# Patient Record
Sex: FEMALE | Race: WHITE | NOT HISPANIC OR LATINO
[De-identification: names, ages, dates, MRNs, and addresses within clinical notes are randomized per-mention and may not be internally consistent; named-entity substitution may affect disease eponyms.]

---

## 2017-10-23 ENCOUNTER — APPOINTMENT (OUTPATIENT)
Dept: INTERNAL MEDICINE | Facility: CLINIC | Age: 47
End: 2017-10-23

## 2018-05-25 ENCOUNTER — EMERGENCY (EMERGENCY)
Facility: HOSPITAL | Age: 48
LOS: 1 days | Discharge: ANOTHER HOSPITAL | End: 2018-05-25
Attending: EMERGENCY MEDICINE | Admitting: EMERGENCY MEDICINE
Payer: SELF-PAY

## 2018-05-25 VITALS
SYSTOLIC BLOOD PRESSURE: 137 MMHG | RESPIRATION RATE: 20 BRPM | OXYGEN SATURATION: 100 % | DIASTOLIC BLOOD PRESSURE: 90 MMHG | TEMPERATURE: 98 F | HEART RATE: 67 BPM

## 2018-05-25 DIAGNOSIS — Z98.84 BARIATRIC SURGERY STATUS: Chronic | ICD-10-CM

## 2018-05-25 LAB
ALBUMIN SERPL ELPH-MCNC: 4.3 G/DL — SIGNIFICANT CHANGE UP (ref 3.3–5)
ALP SERPL-CCNC: 94 U/L — SIGNIFICANT CHANGE UP (ref 40–120)
ALT FLD-CCNC: 24 U/L — SIGNIFICANT CHANGE UP (ref 4–33)
AST SERPL-CCNC: 28 U/L — SIGNIFICANT CHANGE UP (ref 4–32)
BASE EXCESS BLDV CALC-SCNC: 0.5 MMOL/L — SIGNIFICANT CHANGE UP
BASOPHILS # BLD AUTO: 0.06 K/UL — SIGNIFICANT CHANGE UP (ref 0–0.2)
BASOPHILS NFR BLD AUTO: 1 % — SIGNIFICANT CHANGE UP (ref 0–2)
BILIRUB SERPL-MCNC: 0.6 MG/DL — SIGNIFICANT CHANGE UP (ref 0.2–1.2)
BLOOD GAS VENOUS - CREATININE: 0.69 MG/DL — SIGNIFICANT CHANGE UP (ref 0.5–1.3)
BUN SERPL-MCNC: 11 MG/DL — SIGNIFICANT CHANGE UP (ref 7–23)
CALCIUM SERPL-MCNC: 9.1 MG/DL — SIGNIFICANT CHANGE UP (ref 8.4–10.5)
CHLORIDE BLDV-SCNC: 105 MMOL/L — SIGNIFICANT CHANGE UP (ref 96–108)
CHLORIDE SERPL-SCNC: 101 MMOL/L — SIGNIFICANT CHANGE UP (ref 98–107)
CO2 SERPL-SCNC: 24 MMOL/L — SIGNIFICANT CHANGE UP (ref 22–31)
CREAT SERPL-MCNC: 0.67 MG/DL — SIGNIFICANT CHANGE UP (ref 0.5–1.3)
EOSINOPHIL # BLD AUTO: 0.02 K/UL — SIGNIFICANT CHANGE UP (ref 0–0.5)
EOSINOPHIL NFR BLD AUTO: 0.3 % — SIGNIFICANT CHANGE UP (ref 0–6)
GAS PNL BLDV: 137 MMOL/L — SIGNIFICANT CHANGE UP (ref 136–146)
GLUCOSE BLDV-MCNC: 127 — HIGH (ref 70–99)
GLUCOSE SERPL-MCNC: 121 MG/DL — HIGH (ref 70–99)
HCG SERPL-ACNC: < 5 MIU/ML — SIGNIFICANT CHANGE UP
HCO3 BLDV-SCNC: 23 MMOL/L — SIGNIFICANT CHANGE UP (ref 20–27)
HCT VFR BLD CALC: 40 % — SIGNIFICANT CHANGE UP (ref 34.5–45)
HCT VFR BLDV CALC: 44.4 % — SIGNIFICANT CHANGE UP (ref 34.5–45)
HGB BLD-MCNC: 13.6 G/DL — SIGNIFICANT CHANGE UP (ref 11.5–15.5)
HGB BLDV-MCNC: 14.5 G/DL — SIGNIFICANT CHANGE UP (ref 11.5–15.5)
IMM GRANULOCYTES # BLD AUTO: 0.02 # — SIGNIFICANT CHANGE UP
IMM GRANULOCYTES NFR BLD AUTO: 0.3 % — SIGNIFICANT CHANGE UP (ref 0–1.5)
LACTATE BLDV-MCNC: 2.1 MMOL/L — HIGH (ref 0.5–2)
LIDOCAIN IGE QN: 13.8 U/L — SIGNIFICANT CHANGE UP (ref 7–60)
LYMPHOCYTES # BLD AUTO: 0.71 K/UL — LOW (ref 1–3.3)
LYMPHOCYTES # BLD AUTO: 11.8 % — LOW (ref 13–44)
MCHC RBC-ENTMCNC: 28.5 PG — SIGNIFICANT CHANGE UP (ref 27–34)
MCHC RBC-ENTMCNC: 34 % — SIGNIFICANT CHANGE UP (ref 32–36)
MCV RBC AUTO: 83.7 FL — SIGNIFICANT CHANGE UP (ref 80–100)
MONOCYTES # BLD AUTO: 0.28 K/UL — SIGNIFICANT CHANGE UP (ref 0–0.9)
MONOCYTES NFR BLD AUTO: 4.6 % — SIGNIFICANT CHANGE UP (ref 2–14)
NEUTROPHILS # BLD AUTO: 4.95 K/UL — SIGNIFICANT CHANGE UP (ref 1.8–7.4)
NEUTROPHILS NFR BLD AUTO: 82 % — HIGH (ref 43–77)
NRBC # FLD: 0 — SIGNIFICANT CHANGE UP
PCO2 BLDV: 43 MMHG — SIGNIFICANT CHANGE UP (ref 41–51)
PH BLDV: 7.38 PH — SIGNIFICANT CHANGE UP (ref 7.32–7.43)
PLATELET # BLD AUTO: 323 K/UL — SIGNIFICANT CHANGE UP (ref 150–400)
PMV BLD: 10.4 FL — SIGNIFICANT CHANGE UP (ref 7–13)
PO2 BLDV: 30 MMHG — LOW (ref 35–40)
POTASSIUM BLDV-SCNC: 3.8 MMOL/L — SIGNIFICANT CHANGE UP (ref 3.4–4.5)
POTASSIUM SERPL-MCNC: 4.2 MMOL/L — SIGNIFICANT CHANGE UP (ref 3.5–5.3)
POTASSIUM SERPL-SCNC: 4.2 MMOL/L — SIGNIFICANT CHANGE UP (ref 3.5–5.3)
PROT SERPL-MCNC: 7.2 G/DL — SIGNIFICANT CHANGE UP (ref 6–8.3)
RBC # BLD: 4.78 M/UL — SIGNIFICANT CHANGE UP (ref 3.8–5.2)
RBC # FLD: 13.7 % — SIGNIFICANT CHANGE UP (ref 10.3–14.5)
SAO2 % BLDV: 48.2 % — LOW (ref 60–85)
SODIUM SERPL-SCNC: 139 MMOL/L — SIGNIFICANT CHANGE UP (ref 135–145)
WBC # BLD: 6.04 K/UL — SIGNIFICANT CHANGE UP (ref 3.8–10.5)
WBC # FLD AUTO: 6.04 K/UL — SIGNIFICANT CHANGE UP (ref 3.8–10.5)

## 2018-05-25 PROCEDURE — 99285 EMERGENCY DEPT VISIT HI MDM: CPT

## 2018-05-25 RX ORDER — FAMOTIDINE 10 MG/ML
20 INJECTION INTRAVENOUS ONCE
Qty: 0 | Refills: 0 | Status: COMPLETED | OUTPATIENT
Start: 2018-05-25 | End: 2018-05-25

## 2018-05-25 RX ORDER — METOCLOPRAMIDE HCL 10 MG
10 TABLET ORAL ONCE
Qty: 0 | Refills: 0 | Status: COMPLETED | OUTPATIENT
Start: 2018-05-25 | End: 2018-05-25

## 2018-05-25 RX ORDER — MORPHINE SULFATE 50 MG/1
4 CAPSULE, EXTENDED RELEASE ORAL ONCE
Qty: 0 | Refills: 0 | Status: DISCONTINUED | OUTPATIENT
Start: 2018-05-25 | End: 2018-05-25

## 2018-05-25 RX ORDER — SODIUM CHLORIDE 9 MG/ML
2000 INJECTION INTRAMUSCULAR; INTRAVENOUS; SUBCUTANEOUS ONCE
Qty: 0 | Refills: 0 | Status: COMPLETED | OUTPATIENT
Start: 2018-05-25 | End: 2018-05-25

## 2018-05-25 RX ORDER — ONDANSETRON 8 MG/1
4 TABLET, FILM COATED ORAL ONCE
Qty: 0 | Refills: 0 | Status: COMPLETED | OUTPATIENT
Start: 2018-05-25 | End: 2018-05-25

## 2018-05-25 RX ADMIN — FAMOTIDINE 104 MILLIGRAM(S): 10 INJECTION INTRAVENOUS at 22:10

## 2018-05-25 RX ADMIN — ONDANSETRON 4 MILLIGRAM(S): 8 TABLET, FILM COATED ORAL at 21:44

## 2018-05-25 RX ADMIN — Medication 10 MILLIGRAM(S): at 22:51

## 2018-05-25 RX ADMIN — SODIUM CHLORIDE 2000 MILLILITER(S): 9 INJECTION INTRAMUSCULAR; INTRAVENOUS; SUBCUTANEOUS at 22:45

## 2018-05-25 NOTE — ED PROVIDER NOTE - PHYSICAL EXAMINATION
Dr Jaramillo  pt sitting up, anxious, yelling. Aox3, mmm. non icteric. normal S1-S2 good air entry. nondistended, +BS, nontender to palpation. no guarding.

## 2018-05-25 NOTE — ED PROVIDER NOTE - OBJECTIVE STATEMENT
Dr Jaramillo  46yo F hx of asthma, sp gastric sleeve in sept 2017 at [facility], pw abdominal pain, nausea and vomit. States he had abdominal pain and vomited two days ago, went to an  given nausea med, felt better. Yesterday she was "fine". Today had "fish tacos" abdominal pain began, felt nausea and vomited several times. NBNB vomit. no diarrhea. +"a lot of gas from above" went to store for "gas medication" called EMS. no fever. no chest pain. no sob. Denies preg.   surgical hx "gastric cysts as a child" "skin graft" of abdomen years ago. 58 y/o F s/p gastric sleeve 09/2017 presents with complaint of upper abdominal pain and nausea/vomiting. Patient began to have symptoms 2 days ago which initially resolved but now recurred. Denies any fever, chills,   Dr Jaramillo  46yo F hx of asthma, sp gastric sleeve in sept 2017 at [facility], pw abdominal pain, nausea and vomit. States he had abdominal pain and vomited two days ago, went to an  given nausea med, felt better. Yesterday she was "fine". Today had "fish tacos" abdominal pain began, felt nausea and vomited several times. NBNB vomit. no diarrhea. +"a lot of gas from above" went to store for "gas medication" called EMS. no fever. no chest pain. no sob. Denies preg.   surgical hx "gastric cysts as a child" "skin graft" of abdomen years ago. 56 y/o F s/p gastric sleeve 09/2017 presents with complaint of upper abdominal pain and nausea/vomiting. Patient began to have symptoms 2 days ago which resolved yesterday. Symptoms recurred today after eating "Fish tacos". Complaining of upper abd discomfort with nausea/vomiting today. Unable to tolerate PO. States that she has been taking zantac with relieve. Denies any fever, chills.  Dr Jaramillo  46yo F hx of asthma, sp gastric sleeve in sept 2017 at [facility], pw abdominal pain, nausea and vomit. States he had abdominal pain and vomited two days ago, went to an  given nausea med, felt better. Yesterday she was "fine". Today had "fish tacos" abdominal pain began, felt nausea and vomited several times. NBNB vomit. no diarrhea. +"a lot of gas from above" went to store for "gas medication" called EMS. no fever. no chest pain. no sob. Denies preg.   surgical hx "gastric cysts as a child" "skin graft" of abdomen years ago.

## 2018-05-25 NOTE — ED ADULT TRIAGE NOTE - CHIEF COMPLAINT QUOTE
brought from [facility] by EMS c/o abdm pain associated w/ upper abdm pain, non-radiating, s/p bariatric sleeve placement 9/2017, states attempted PO intake 2 days ago followed by N/V and abdm pain, resolved, attempted PO intake today followed by same, appears uncomfortable on presentation

## 2018-05-25 NOTE — ED ADULT NURSE NOTE - OBJECTIVE STATEMENT
47y F AaOx3 c/o upper abdominal pain x1 day. pt states that her pain has started earlier today after eating a shrimp taco . pt states her pain is a 10/10 and denies diarrhea at this time. pt presents with nausea and vomiting. pt states it is a cramping pain and a squeezing in her insides. pt deneis blood in stool, pain during urination, or any GI medical hx. pt has not taken anything for the medication before entering ED. VS as noted. labs collected and sent. pt 47y F AaOx3 c/o upper abdominal pain x1 day. pt states that her pain has started earlier today after eating a shrimp taco . pt states her pain is a 10/10 and denies diarrhea at this time. pt presents with nausea and vomiting. pt states it is a cramping pain and a squeezing in her insides. pt deneis blood in stool, pain during urination, or any GI medical hx. pt has not taken anything for the medication before entering ED. VS as noted. labs collected and sent. pt medicated as per md order

## 2018-05-25 NOTE — ED PROVIDER NOTE - PROGRESS NOTE DETAILS
pt calm no vomiting. pending ct to be done. Endorsed to Dr Hutchinson AJM: pt received in sign out. workup shows possible acalc sally. since pt is bariatic patient she needs eval by bariatric team at [facility]. Accepted by surgery dr Walter Cutler and ED doctor Dr White. pt consented.

## 2018-05-26 ENCOUNTER — EMERGENCY (EMERGENCY)
Facility: HOSPITAL | Age: 48
LOS: 1 days | Discharge: HOME | End: 2018-05-26
Attending: EMERGENCY MEDICINE
Payer: MEDICAID

## 2018-05-26 VITALS
OXYGEN SATURATION: 98 % | RESPIRATION RATE: 16 BRPM | SYSTOLIC BLOOD PRESSURE: 112 MMHG | TEMPERATURE: 98 F | HEART RATE: 53 BPM | DIASTOLIC BLOOD PRESSURE: 60 MMHG

## 2018-05-26 VITALS
SYSTOLIC BLOOD PRESSURE: 116 MMHG | DIASTOLIC BLOOD PRESSURE: 73 MMHG | HEART RATE: 65 BPM | RESPIRATION RATE: 16 BRPM | OXYGEN SATURATION: 100 %

## 2018-05-26 VITALS
SYSTOLIC BLOOD PRESSURE: 168 MMHG | OXYGEN SATURATION: 100 % | DIASTOLIC BLOOD PRESSURE: 93 MMHG | HEART RATE: 54 BPM | RESPIRATION RATE: 16 BRPM

## 2018-05-26 DIAGNOSIS — Z98.84 BARIATRIC SURGERY STATUS: Chronic | ICD-10-CM

## 2018-05-26 LAB
ALBUMIN SERPL ELPH-MCNC: 3.7 G/DL — SIGNIFICANT CHANGE UP (ref 3.3–5)
ALP SERPL-CCNC: 80 U/L — SIGNIFICANT CHANGE UP (ref 40–120)
ALT FLD-CCNC: 31 U/L — SIGNIFICANT CHANGE UP (ref 10–45)
ANION GAP SERPL CALC-SCNC: 11 MMOL/L — SIGNIFICANT CHANGE UP (ref 5–17)
APTT BLD: 24.2 SEC — LOW (ref 27.5–37.4)
AST SERPL-CCNC: 30 U/L — SIGNIFICANT CHANGE UP (ref 10–40)
BASOPHILS # BLD AUTO: 0.1 K/UL — SIGNIFICANT CHANGE UP (ref 0–0.2)
BASOPHILS NFR BLD AUTO: 1.2 % — SIGNIFICANT CHANGE UP (ref 0–2)
BILIRUB SERPL-MCNC: 0.8 MG/DL — SIGNIFICANT CHANGE UP (ref 0.2–1.2)
BLD GP AB SCN SERPL QL: NEGATIVE — SIGNIFICANT CHANGE UP
BUN SERPL-MCNC: 7 MG/DL — SIGNIFICANT CHANGE UP (ref 7–23)
CALCIUM SERPL-MCNC: 9 MG/DL — SIGNIFICANT CHANGE UP (ref 8.4–10.5)
CHLORIDE SERPL-SCNC: 103 MMOL/L — SIGNIFICANT CHANGE UP (ref 96–108)
CO2 SERPL-SCNC: 23 MMOL/L — SIGNIFICANT CHANGE UP (ref 22–31)
CREAT SERPL-MCNC: 0.64 MG/DL — SIGNIFICANT CHANGE UP (ref 0.5–1.3)
EOSINOPHIL # BLD AUTO: 0.1 K/UL — SIGNIFICANT CHANGE UP (ref 0–0.5)
EOSINOPHIL NFR BLD AUTO: 0.9 % — SIGNIFICANT CHANGE UP (ref 0–6)
GLUCOSE SERPL-MCNC: 108 MG/DL — HIGH (ref 70–99)
HCT VFR BLD CALC: 38.6 % — SIGNIFICANT CHANGE UP (ref 34.5–45)
HGB BLD-MCNC: 12.7 G/DL — SIGNIFICANT CHANGE UP (ref 11.5–15.5)
INR BLD: 1.12 RATIO — SIGNIFICANT CHANGE UP (ref 0.88–1.16)
LYMPHOCYTES # BLD AUTO: 1.4 K/UL — SIGNIFICANT CHANGE UP (ref 1–3.3)
LYMPHOCYTES # BLD AUTO: 23.6 % — SIGNIFICANT CHANGE UP (ref 13–44)
MCHC RBC-ENTMCNC: 28.8 PG — SIGNIFICANT CHANGE UP (ref 27–34)
MCHC RBC-ENTMCNC: 33 GM/DL — SIGNIFICANT CHANGE UP (ref 32–36)
MCV RBC AUTO: 87.5 FL — SIGNIFICANT CHANGE UP (ref 80–100)
MONOCYTES # BLD AUTO: 0.5 K/UL — SIGNIFICANT CHANGE UP (ref 0–0.9)
MONOCYTES NFR BLD AUTO: 8.1 % — SIGNIFICANT CHANGE UP (ref 2–14)
NEUTROPHILS # BLD AUTO: 3.8 K/UL — SIGNIFICANT CHANGE UP (ref 1.8–7.4)
NEUTROPHILS NFR BLD AUTO: 66.2 % — SIGNIFICANT CHANGE UP (ref 43–77)
PLATELET # BLD AUTO: 273 K/UL — SIGNIFICANT CHANGE UP (ref 150–400)
POTASSIUM SERPL-MCNC: 3.7 MMOL/L — SIGNIFICANT CHANGE UP (ref 3.5–5.3)
POTASSIUM SERPL-SCNC: 3.7 MMOL/L — SIGNIFICANT CHANGE UP (ref 3.5–5.3)
PROT SERPL-MCNC: 6 G/DL — SIGNIFICANT CHANGE UP (ref 6–8.3)
PROTHROM AB SERPL-ACNC: 12.1 SEC — SIGNIFICANT CHANGE UP (ref 9.8–12.7)
RBC # BLD: 4.41 M/UL — SIGNIFICANT CHANGE UP (ref 3.8–5.2)
RBC # FLD: 12.5 % — SIGNIFICANT CHANGE UP (ref 10.3–14.5)
RH IG SCN BLD-IMP: POSITIVE — SIGNIFICANT CHANGE UP
RH IG SCN BLD-IMP: POSITIVE — SIGNIFICANT CHANGE UP
SODIUM SERPL-SCNC: 137 MMOL/L — SIGNIFICANT CHANGE UP (ref 135–145)
WBC # BLD: 5.8 K/UL — SIGNIFICANT CHANGE UP (ref 3.8–10.5)
WBC # FLD AUTO: 5.8 K/UL — SIGNIFICANT CHANGE UP (ref 3.8–10.5)

## 2018-05-26 PROCEDURE — 86900 BLOOD TYPING SEROLOGIC ABO: CPT

## 2018-05-26 PROCEDURE — 99284 EMERGENCY DEPT VISIT MOD MDM: CPT | Mod: 25

## 2018-05-26 PROCEDURE — 80053 COMPREHEN METABOLIC PANEL: CPT

## 2018-05-26 PROCEDURE — 86850 RBC ANTIBODY SCREEN: CPT

## 2018-05-26 PROCEDURE — 78226 HEPATOBILIARY SYSTEM IMAGING: CPT | Mod: 26

## 2018-05-26 PROCEDURE — 78226 HEPATOBILIARY SYSTEM IMAGING: CPT

## 2018-05-26 PROCEDURE — 85610 PROTHROMBIN TIME: CPT

## 2018-05-26 PROCEDURE — 86901 BLOOD TYPING SEROLOGIC RH(D): CPT

## 2018-05-26 PROCEDURE — 85730 THROMBOPLASTIN TIME PARTIAL: CPT

## 2018-05-26 PROCEDURE — 76705 ECHO EXAM OF ABDOMEN: CPT | Mod: 26

## 2018-05-26 PROCEDURE — A9537: CPT

## 2018-05-26 PROCEDURE — 74176 CT ABD & PELVIS W/O CONTRAST: CPT | Mod: 26

## 2018-05-26 PROCEDURE — 85027 COMPLETE CBC AUTOMATED: CPT

## 2018-05-26 RX ORDER — HALOPERIDOL DECANOATE 100 MG/ML
2.5 INJECTION INTRAMUSCULAR ONCE
Qty: 0 | Refills: 0 | Status: COMPLETED | OUTPATIENT
Start: 2018-05-26 | End: 2018-05-26

## 2018-05-26 RX ADMIN — HALOPERIDOL DECANOATE 2.5 MILLIGRAM(S): 100 INJECTION INTRAMUSCULAR at 01:33

## 2018-05-26 NOTE — ED ADULT NURSE REASSESSMENT NOTE - NS ED NURSE REASSESS COMMENT FT1
vss; iv site benign; + flush; + blood return;  pt very groggy; wakes to voice and follows commands; pt was given haldol at previous hospital

## 2018-05-26 NOTE — CONSULT NOTE ADULT - ASSESSMENT
47 year old female s/p sleeve gastrectomy in September 2017 presents with 2 days of right upper quadrant pain, nausea and vomiting.  Ultrasound performed shows evidence of gallbladder wall thickening to 5mm and mild pericholecystic fluid, no evidence of stones concerning for acalculous cholecystitis  -Patient's abdominal pain resolved  -Labs reviewed from [facility], normal WBC and LFTs  -Please obtain a HIDA scan to rule out acute cholecystitis 47 year old female s/p sleeve gastrectomy in September 2017 presents with 2 days of right upper quadrant pain, nausea and vomiting.  Ultrasound performed shows evidence of gallbladder wall thickening to 5mm and mild pericholecystic fluid, no evidence of stones concerning for acalculous cholecystitis  -Patient's abdominal pain resolved  -Labs reviewed from [facility], normal WBC and LFTs  -Please obtain a HIDA scan to rule out acute cholecystitis    *Addendum   HIDA scan reviewed, negative for acute cholecystitis  Trial of PO, if patient tolerated, can be discharged with follow up with their bariatric surgeon

## 2018-05-26 NOTE — ED PROVIDER NOTE - PHYSICAL EXAMINATION
Gen: Well appearing, sleepy, NAD  Head: NCAT  HEENT: MMM, Normal conjunctiva  Lung: CTAB, no rales, rhonchi or wheezing  CV: RRR, no murmurs, rubs or gallops  Abd: soft, NTND, no rebound or guarding  MSK: No edema, no visible deformities  Neuro: No focal neurologic deficits.  Skin: Warm and dry, no evidence of rash  Psych: normal mood and affect, A&Ox3

## 2018-05-26 NOTE — ED PROVIDER NOTE - ATTENDING CONTRIBUTION TO CARE
ATTENDING MD:  Adriano VALLEJO, personally have seen and examined this patient.  I have discussed all aspects of care with the resident physician. Resident note reviewed and agree on plan of care and except where noted.  See HPI, PE, and MDM for details.     GEN: laying in stretcher comfortably, NAD, A & O x 4  HEAD/EYES: NCAT, PERRL, EOMI, anicteric sclerae, no conjunctival pallor  ENT: mucus membranes moist, oropharynx WNL, trachea midline, no JVD  RESP: lungs CTA with equal breath sounds bilaterally, chest wall nontender and atraumatic  CV: heart with reg rhythm S1, S2, no murmur; distal pulses intact and symmetric bilaterally  ABDOMEN: normoactive bowel sounds, soft, nondistended, nontender, no palpable masses  : no CVAT  MSK: extremities atraumatic and nontender, no edema, no asymmetry. the back is nontender  SKIN: warm, dry, no rash, no bruising, no cyanosis. color appropriate for ethnicity  NEURO: alert, mentating appropriately, no facial asymmetry. gross sensation, motor, coordination are intact    MDM: comfortable female with upper abdominal pain and vomiting now resolved, with imaging concerning for acalculous cholecystitis. surgery consult, ZARI PRN. no need for acute pain management.

## 2018-05-26 NOTE — ED ADULT NURSE NOTE - OBJECTIVE STATEMENT
Transfer from [facility] for bariatric consult. As per EMS, pt with hx gastric sleeve surgery a year ago. Pt had 3 days abdominal pain and went to [facility]. CT and ultrasound scan done at [facility] suspicious for cholecystitis. On arrival to ED, pt very sleepy. Pt received Haldol at [facility] as per EMS. Pt arouses to voice. Follows commands. Denies cp/sob. Respirations even and unlabored. No n/v/d at present. Awaiting eval by MD.

## 2018-05-26 NOTE — CONSULT NOTE ADULT - ATTENDING COMMENTS
I have seen and examined the patient. I agree with the above surgery resident's note.  HIDA to r/o cholecystitis

## 2018-05-26 NOTE — ED PROVIDER NOTE - OBJECTIVE STATEMENT
47 y.o. female hx of gastric sleeve in 2017, transfer from [facility] after presenting with upper ab pain x 2 days, worsening today with nausea and vomiting. Had ct and US concerning for acalculous cholecystitis, given analgesia and haldol for "agitation" and transferred for bariatric consultation. Pt currently sleepy but without pain or nausea at this time.

## 2018-05-26 NOTE — CONSULT NOTE ADULT - SUBJECTIVE AND OBJECTIVE BOX
47 y.o. female hx of gastric sleeve in September of 2017 at [place], transfer from [facility] after presenting with upper ab pain x 2 days, worsening last night with nausea and vomiting. Had ct and US concerning for acalculous cholecystitis, given analgesia and haldol for "agitation" and transferred for bariatric consultation. Pt currently sleepy but without pain or nausea at this time.  Patient reports 2 days ago after eating chicken and rice, started having some epigastric pain and several episodes of non bloody non billious emesis.  Pain resolved and then last night had shrimp tacos and started having similar pain.  No nausea or vomiting.  No report of any fevers, chills, shortness of breath, palpitations.      PAST MEDICAL & SURGICAL HISTORY:    ICU Vital Signs Last 24 Hrs  T(C): 36.7 (26 May 2018 07:45), Max: 36.9 (26 May 2018 06:55)  T(F): 98.1 (26 May 2018 07:45), Max: 98.4 (26 May 2018 06:55)  HR: 50 (26 May 2018 07:45) (50 - 53)  BP: 108/62 (26 May 2018 07:45) (108/62 - 112/60)  BP(mean): --  ABP: --  ABP(mean): --  RR: 14 (26 May 2018 07:45) (14 - 16)  SpO2: 97% (26 May 2018 07:45) (97% - 98%)    General:  Sitting up in bed, appears comfortable  Chest:  Breath sounds audible bilaterally; no wheezing, rales or ronchi  Abdomen:  Soft, nontender, nondistended  Extremities:  Warm, well perfused                          12.7   5.8   )-----------( 273      ( 26 May 2018 07:56 )             38.6     EXAM:  CT ABDOMEN AND PELVIS        PROCEDURE DATE:  May 26 2018         INTERPRETATION:  CLINICAL INFORMATION: Epigastric pain for 2 days. Nausea   and vomiting. History of gastric sleeve.    COMPARISON: None available.    PROCEDURE:   CT of the Abdomen and Pelvis was performed without intravenous contrast.   Intravenous contrast: None.  Oral contrast: None.  Sagittal and coronal reformats were performed.    FINDINGS:    LOWER CHEST: Within normal limits.    LIVER: Within normal limits.  BILE DUCTS: Normal caliber.  GALLBLADDER: Asymmetric gallbladder wall edema. No radiopaque   cholelithiasis.  SPLEEN: Within normal limits.  PANCREAS: Fatty infiltration of the head of the pancreas.  ADRENALS: Within normal limits.  KIDNEYS/URETERS: Within normal limits.    BLADDER: Within normal limits.  REPRODUCTIVE ORGANS: The uterus and right ovary are within normal limits.   3.4 x 2.0 cm left ovarian cyst.    BOWEL: Status post gastric sleeve procedure. No bowel obstruction or   inflammation. Appendix is not visualized.  PERITONEUM: Trace ascites in the right paracolic gutter and pelvis.  VESSELS:  Mild atherosclerotic changes.  RETROPERITONEUM: No lymphadenopathy.    ABDOMINAL WALL: Within normal limits.  BONES: Within normal limits.    IMPRESSION:     Asymmetric gallbladder wall edema which raises concern for an acute   cholecystitis despite the lack of calcified gallstones. Correlate with   laboratory values, physical examination and if clinically warranted a   right upper quadrant ultrasound or nuclear HIDA scan.      EXAM:  US ABDOMEN LIMITED        PROCEDURE DATE:  May 26 2018         INTERPRETATION:  CLINICAL INFORMATION: Right upper quadrant pain.   Gallbladder wall edema on CT.    COMPARISON: CT abdomen and pelvis from 5/26/2018 at 1:56 AM.    TECHNIQUE: Sonography of the right upper quadrant.     FINDINGS:    Liver: Right hepatic lobe measures 14.2 cm. Within normal limits.    Bile ducts: Normal caliber. Common hepatic duct measures 3 mm.     Gallbladder: No discrete gallstone is visualized. Gallbladder wall is   thickened to 5 mm. Mild pericholecystic fluid is present. Equivocal   sonographic Hernandez's sign as the patient received pain medication.    Pancreas: Not well visualized.    Right kidney: 11.9 cm. No hydronephrosis.    Ascites: None.    Aorta/IVC: Visualized portions are within normal limits.    IMPRESSION:     Gallbladder wall thickening and mild pericholecystic fluid without   evidence of a gallstone raising the possibility of an acalculus   cholecystitis.

## 2018-05-26 NOTE — ED PROVIDER NOTE - NS ED ROS FT
ROS: denies HA, weakness, dizziness, fevers/chills, chest pain, SOB, diaphoresis, back/neck pain, dysuria/hematuria, or rash  +ab pain, nausea, vomiting CONST: no fevers, no chills, no trauma  EYES: no pain, no visual disturbances  ENT: no sore throat, no epistaxis, no rhinorrhea, no hearing changes  CV: no chest pain, no palpitations, no orthopnea, no extremity pain or swelling  RESP: no shortness of breath, no cough, no sputum, no pleurisy, no wheezing  ABD: see HPI  : no dysuria, no hematuria, no frequency, no urgency  MSK: no back pain, no neck pain, no extremity pain  NEURO: no headache, no sensory disturbances, no focal weakness, no dizziness  HEME: no easy bleeding or bruising  SKIN: no diaphoresis, no rash

## 2018-05-26 NOTE — ED PROVIDER NOTE - PROGRESS NOTE DETAILS
Faustinoa negative, pt tolerated PO, feeling well, wants to go home, cleared by surgery, ok for discharge. Dylan Watkins DO

## 2019-06-07 ENCOUNTER — OUTPATIENT (OUTPATIENT)
Dept: OUTPATIENT SERVICES | Facility: HOSPITAL | Age: 49
LOS: 1 days | End: 2019-06-07
Payer: SELF-PAY

## 2019-06-07 VITALS
OXYGEN SATURATION: 100 % | HEIGHT: 66 IN | HEART RATE: 65 BPM | RESPIRATION RATE: 16 BRPM | TEMPERATURE: 99 F | WEIGHT: 151.02 LBS | DIASTOLIC BLOOD PRESSURE: 75 MMHG | SYSTOLIC BLOOD PRESSURE: 140 MMHG

## 2019-06-07 DIAGNOSIS — H02.403 UNSPECIFIED PTOSIS OF BILATERAL EYELIDS: ICD-10-CM

## 2019-06-07 DIAGNOSIS — Z01.818 ENCOUNTER FOR OTHER PREPROCEDURAL EXAMINATION: ICD-10-CM

## 2019-06-07 DIAGNOSIS — Z98.84 BARIATRIC SURGERY STATUS: Chronic | ICD-10-CM

## 2019-06-07 DIAGNOSIS — H02.409 UNSPECIFIED PTOSIS OF UNSPECIFIED EYELID: ICD-10-CM

## 2019-06-07 LAB
ALBUMIN SERPL ELPH-MCNC: 4.3 G/DL — SIGNIFICANT CHANGE UP (ref 3.3–5)
ALP SERPL-CCNC: 130 U/L — HIGH (ref 40–120)
ALT FLD-CCNC: 29 U/L — SIGNIFICANT CHANGE UP (ref 12–78)
ANION GAP SERPL CALC-SCNC: 4 MMOL/L — LOW (ref 5–17)
AST SERPL-CCNC: 19 U/L — SIGNIFICANT CHANGE UP (ref 15–37)
BILIRUB SERPL-MCNC: 0.7 MG/DL — SIGNIFICANT CHANGE UP (ref 0.2–1.2)
BUN SERPL-MCNC: 12 MG/DL — SIGNIFICANT CHANGE UP (ref 7–23)
CALCIUM SERPL-MCNC: 9.2 MG/DL — SIGNIFICANT CHANGE UP (ref 8.5–10.1)
CHLORIDE SERPL-SCNC: 107 MMOL/L — SIGNIFICANT CHANGE UP (ref 96–108)
CO2 SERPL-SCNC: 28 MMOL/L — SIGNIFICANT CHANGE UP (ref 22–31)
CREAT SERPL-MCNC: 0.79 MG/DL — SIGNIFICANT CHANGE UP (ref 0.5–1.3)
GLUCOSE SERPL-MCNC: 135 MG/DL — HIGH (ref 70–99)
HCG SERPL-ACNC: <1 MIU/ML — SIGNIFICANT CHANGE UP
HCT VFR BLD CALC: 40.4 % — SIGNIFICANT CHANGE UP (ref 34.5–45)
HGB BLD-MCNC: 13.6 G/DL — SIGNIFICANT CHANGE UP (ref 11.5–15.5)
MCHC RBC-ENTMCNC: 29.1 PG — SIGNIFICANT CHANGE UP (ref 27–34)
MCHC RBC-ENTMCNC: 33.7 GM/DL — SIGNIFICANT CHANGE UP (ref 32–36)
MCV RBC AUTO: 86.5 FL — SIGNIFICANT CHANGE UP (ref 80–100)
NRBC # BLD: 0 /100 WBCS — SIGNIFICANT CHANGE UP (ref 0–0)
PLATELET # BLD AUTO: 372 K/UL — SIGNIFICANT CHANGE UP (ref 150–400)
POTASSIUM SERPL-MCNC: 4.7 MMOL/L — SIGNIFICANT CHANGE UP (ref 3.5–5.3)
POTASSIUM SERPL-SCNC: 4.7 MMOL/L — SIGNIFICANT CHANGE UP (ref 3.5–5.3)
PROT SERPL-MCNC: 7.6 G/DL — SIGNIFICANT CHANGE UP (ref 6–8.3)
RBC # BLD: 4.67 M/UL — SIGNIFICANT CHANGE UP (ref 3.8–5.2)
RBC # FLD: 13 % — SIGNIFICANT CHANGE UP (ref 10.3–14.5)
SODIUM SERPL-SCNC: 139 MMOL/L — SIGNIFICANT CHANGE UP (ref 135–145)
WBC # BLD: 4.15 K/UL — SIGNIFICANT CHANGE UP (ref 3.8–10.5)
WBC # FLD AUTO: 4.15 K/UL — SIGNIFICANT CHANGE UP (ref 3.8–10.5)

## 2019-06-07 PROCEDURE — 84702 CHORIONIC GONADOTROPIN TEST: CPT

## 2019-06-07 PROCEDURE — 36415 COLL VENOUS BLD VENIPUNCTURE: CPT

## 2019-06-07 PROCEDURE — 85027 COMPLETE CBC AUTOMATED: CPT

## 2019-06-07 PROCEDURE — G0463: CPT

## 2019-06-07 PROCEDURE — 80053 COMPREHEN METABOLIC PANEL: CPT

## 2019-06-07 NOTE — H&P PST ADULT - NSICDXPROBLEM_GEN_ALL_CORE_FT
PROBLEM DIAGNOSES  Problem: Ptosis, unspecified laterality  Assessment and Plan:   pt is scheduled upper blepharoplasty- fat grafting to nasolabial folds and cheeks on 6/18/19. preop instructions provided by SHALINI Evans including NPO status, meds to continue. Verbalized understanding. States has MC tomorrow as requested by surgeon on 6/8, form provided.

## 2019-06-07 NOTE — H&P PST ADULT - HISTORY OF PRESENT ILLNESS
47 y/o female presents to PST w/ a preop dx of unspecified ptosis of bilateral eyelids and to be evaluated for a scheduled upper blepharoplasty- fat grafting to nasolabial folds and cheeks on 6/18/19.   Pt. states difficulty seeing due to eye lid drop and obstructing vision. Pt referred to Dr Hobson, evaluated and recommended surgical intervention at this time. 47 y/o female presents to PST w/ a preop dx of unspecified ptosis of bilateral eyelids and to be evaluated for a scheduled upper blepharoplasty- fat grafting to nasolabial folds and cheeks on 6/18/19.   Pt. states difficulty seeing due to eye lid dropping and obstructing vision. Pt referred to Dr Hobson, evaluated and recommended surgical intervention at this time.

## 2019-06-07 NOTE — H&P PST ADULT - NSANTHOSAYNRD_GEN_A_CORE
never tested/No. ALBERTO screening performed.  STOP BANG Legend: 0-2 = LOW Risk; 3-4 = INTERMEDIATE Risk; 5-8 = HIGH Risk

## 2019-06-07 NOTE — H&P PST ADULT - ASSESSMENT
DX: DX: unspecified ptosis of bilateral eyelids and evaluated for a scheduled upper blepharoplasty- fat grafting to nasolabial folds and cheeks on 6/18/19.

## 2019-06-07 NOTE — H&P PST ADULT - RS GEN PE MLT RESP DETAILS PC
no rhonchi/no subcutaneous emphysema/no chest wall tenderness/respirations non-labored/no wheezes/clear to auscultation bilaterally/breath sounds equal/airway patent/normal/good air movement/no intercostal retractions

## 2019-06-07 NOTE — H&P PST ADULT - NEGATIVE OPHTHALMOLOGIC SYMPTOMS
no scleral injection R/no discharge R/no pain L/no blurred vision L/no irritation L/no irritation R/no loss of vision L/no scleral injection L/difficulty seen due to eye lid dropping, wears corrective lenses/no photophobia/no loss of vision R/no diplopia/no lacrimation L/no lacrimation R/no discharge L/no pain R

## 2019-06-07 NOTE — H&P PST ADULT - GASTROINTESTINAL DETAILS
no bruit/no distention/bowel sounds normal/no rebound tenderness/no rigidity/no guarding/no organomegaly/no masses palpable/soft/nontender

## 2019-06-07 NOTE — H&P PST ADULT - NEGATIVE RESPIRATORY AND THORAX SYMPTOMS
no pleuritic chest pain/no wheezing/no cough/no hemoptysis/no dyspnea/states recent asthma episode, on medrol dose pack and prn inhalers

## 2019-06-17 ENCOUNTER — TRANSCRIPTION ENCOUNTER (OUTPATIENT)
Age: 49
End: 2019-06-17

## 2019-06-18 ENCOUNTER — RESULT REVIEW (OUTPATIENT)
Age: 49
End: 2019-06-18

## 2019-06-18 ENCOUNTER — OUTPATIENT (OUTPATIENT)
Dept: OUTPATIENT SERVICES | Facility: HOSPITAL | Age: 49
LOS: 1 days | End: 2019-06-18
Payer: COMMERCIAL

## 2019-06-18 VITALS
RESPIRATION RATE: 17 BRPM | SYSTOLIC BLOOD PRESSURE: 111 MMHG | DIASTOLIC BLOOD PRESSURE: 78 MMHG | TEMPERATURE: 98 F | HEART RATE: 76 BPM | OXYGEN SATURATION: 96 %

## 2019-06-18 VITALS
SYSTOLIC BLOOD PRESSURE: 119 MMHG | OXYGEN SATURATION: 100 % | WEIGHT: 153 LBS | DIASTOLIC BLOOD PRESSURE: 74 MMHG | HEART RATE: 53 BPM | TEMPERATURE: 98 F | RESPIRATION RATE: 14 BRPM | HEIGHT: 66 IN

## 2019-06-18 DIAGNOSIS — Z98.84 BARIATRIC SURGERY STATUS: Chronic | ICD-10-CM

## 2019-06-18 DIAGNOSIS — Z01.818 ENCOUNTER FOR OTHER PREPROCEDURAL EXAMINATION: ICD-10-CM

## 2019-06-18 DIAGNOSIS — H02.403 UNSPECIFIED PTOSIS OF BILATERAL EYELIDS: ICD-10-CM

## 2019-06-18 LAB — HCG UR QL: NEGATIVE — SIGNIFICANT CHANGE UP

## 2019-06-18 PROCEDURE — 81025 URINE PREGNANCY TEST: CPT

## 2019-06-18 PROCEDURE — 20926: CPT

## 2019-06-18 PROCEDURE — 15822 BLEPHAROPLASTY UPPER EYELID: CPT | Mod: 50

## 2019-06-18 PROCEDURE — 88304 TISSUE EXAM BY PATHOLOGIST: CPT

## 2019-06-18 PROCEDURE — 88304 TISSUE EXAM BY PATHOLOGIST: CPT | Mod: 26

## 2019-06-18 RX ORDER — CEFAZOLIN SODIUM 1 G
2000 VIAL (EA) INJECTION ONCE
Refills: 0 | Status: COMPLETED | OUTPATIENT
Start: 2019-06-18 | End: 2019-06-18

## 2019-06-18 RX ORDER — HYDROMORPHONE HYDROCHLORIDE 2 MG/ML
0.5 INJECTION INTRAMUSCULAR; INTRAVENOUS; SUBCUTANEOUS
Refills: 0 | Status: DISCONTINUED | OUTPATIENT
Start: 2019-06-18 | End: 2019-06-18

## 2019-06-18 RX ORDER — SODIUM CHLORIDE 9 MG/ML
1000 INJECTION, SOLUTION INTRAVENOUS
Refills: 0 | Status: DISCONTINUED | OUTPATIENT
Start: 2019-06-18 | End: 2019-06-18

## 2019-06-18 RX ORDER — ONDANSETRON 8 MG/1
4 TABLET, FILM COATED ORAL ONCE
Refills: 0 | Status: DISCONTINUED | OUTPATIENT
Start: 2019-06-18 | End: 2019-06-18

## 2019-06-18 RX ADMIN — SODIUM CHLORIDE 75 MILLILITER(S): 9 INJECTION, SOLUTION INTRAVENOUS at 19:00

## 2019-06-18 RX ADMIN — HYDROMORPHONE HYDROCHLORIDE 0.5 MILLIGRAM(S): 2 INJECTION INTRAMUSCULAR; INTRAVENOUS; SUBCUTANEOUS at 18:40

## 2019-06-18 RX ADMIN — SODIUM CHLORIDE 75 MILLILITER(S): 9 INJECTION, SOLUTION INTRAVENOUS at 14:11

## 2019-06-18 RX ADMIN — HYDROMORPHONE HYDROCHLORIDE 0.5 MILLIGRAM(S): 2 INJECTION INTRAMUSCULAR; INTRAVENOUS; SUBCUTANEOUS at 18:30

## 2019-06-18 NOTE — BRIEF OPERATIVE NOTE - NSICDXBRIEFPREOP_GEN_ALL_CORE_FT
PRE-OP DIAGNOSIS:  Unspecified ptosis of bilateral eyelids 18-Jun-2019 17:30:08 FACIAL LIPOATROPHY Kevin Burris

## 2019-06-18 NOTE — ASU DISCHARGE PLAN (ADULT/PEDIATRIC) - CARE PROVIDER_API CALL
Cheng Leung)  Surgery  [address] [place] [address]  Phone: [phone]  Fax: [phone]  Follow Up Time: 1 week

## 2019-06-18 NOTE — BRIEF OPERATIVE NOTE - NSICDXBRIEFPOSTOP_GEN_ALL_CORE_FT
POST-OP DIAGNOSIS:  Facial volume depletion 18-Jun-2019 17:35:33  Kevin Burris  Unspecified ptosis of bilateral eyelids 18-Jun-2019 17:30:32 FACIAL LIPOATROPHY Kevin Burris

## 2019-06-18 NOTE — BRIEF OPERATIVE NOTE - NSICDXBRIEFPROCEDURE_GEN_ALL_CORE_FT
PROCEDURES:  Fat grafting 18-Jun-2019 17:28:55 TO NASOLABIAL FOLDS AND CHEEKS Kevin Burris  Blepharoplasty of both upper eyelids 18-Jun-2019 17:27:41  Kevin Burris

## 2020-02-24 ENCOUNTER — OUTPATIENT (OUTPATIENT)
Dept: OUTPATIENT SERVICES | Facility: HOSPITAL | Age: 50
LOS: 1 days | End: 2020-02-24
Payer: MEDICAID

## 2020-02-24 VITALS
HEART RATE: 85 BPM | SYSTOLIC BLOOD PRESSURE: 121 MMHG | WEIGHT: 156.09 LBS | HEIGHT: 66 IN | OXYGEN SATURATION: 98 % | RESPIRATION RATE: 18 BRPM | TEMPERATURE: 98 F | DIASTOLIC BLOOD PRESSURE: 79 MMHG

## 2020-02-24 DIAGNOSIS — Z98.890 OTHER SPECIFIED POSTPROCEDURAL STATES: Chronic | ICD-10-CM

## 2020-02-24 DIAGNOSIS — L90.5 SCAR CONDITIONS AND FIBROSIS OF SKIN: ICD-10-CM

## 2020-02-24 DIAGNOSIS — Z98.84 BARIATRIC SURGERY STATUS: Chronic | ICD-10-CM

## 2020-02-24 DIAGNOSIS — Z01.818 ENCOUNTER FOR OTHER PREPROCEDURAL EXAMINATION: ICD-10-CM

## 2020-02-24 PROCEDURE — G0463: CPT

## 2020-02-24 RX ORDER — CHLORHEXIDINE GLUCONATE 213 G/1000ML
1 SOLUTION TOPICAL ONCE
Refills: 0 | Status: DISCONTINUED | OUTPATIENT
Start: 2020-08-20 | End: 2020-09-04

## 2020-02-24 RX ORDER — LIDOCAINE HCL 20 MG/ML
0.2 VIAL (ML) INJECTION ONCE
Refills: 0 | Status: DISCONTINUED | OUTPATIENT
Start: 2020-08-20 | End: 2020-09-04

## 2020-02-24 RX ORDER — SODIUM CHLORIDE 9 MG/ML
3 INJECTION INTRAMUSCULAR; INTRAVENOUS; SUBCUTANEOUS EVERY 8 HOURS
Refills: 0 | Status: DISCONTINUED | OUTPATIENT
Start: 2020-08-20 | End: 2020-09-04

## 2020-02-24 NOTE — H&P PST ADULT - NSANTHOSAYNRD_GEN_A_CORE
No. ALBERTO screening performed.  STOP BANG Legend: 0-2 = LOW Risk; 3-4 = INTERMEDIATE Risk; 5-8 = HIGH Risk/never tested

## 2020-02-24 NOTE — H&P PST ADULT - NSICDXPROBLEM_GEN_ALL_CORE_FT
PROBLEM DIAGNOSES  Problem: Scar condition and fibrosis of skin  Assessment and Plan: Scar Revision [facility] back 2/27/20   Pre- Op instructionds discussed   medical eval, Labs reviewed on file   no pre emptive h/o hastric bypass [facility] the DOS

## 2020-02-24 NOTE — H&P PST ADULT - GASTROINTESTINAL DETAILS
nontender/no distention/no masses palpable/soft/bowel sounds normal/no organomegaly/no bruit/no rebound tenderness/no rigidity/no guarding

## 2020-02-24 NOTE — H&P PST ADULT - NSICDXPASTSURGICALHX_GEN_ALL_CORE_FT
PAST SURGICAL HISTORY:  H/O gastric bypass 2017 sleeve    History of facial surgery Bilateral cosmetic upper blepharoplasty, liposuction of the anterior lower abdomen for fat harvesting for facial fat grafting into the mid and lower face. 6/2019    S/P excision of lipoma 2014

## 2020-02-24 NOTE — H&P PST ADULT - RS GEN PE MLT RESP DETAILS PC
respirations non-labored/no intercostal retractions/no rhonchi/no wheezes/normal/good air movement/no chest wall tenderness/airway patent/no subcutaneous emphysema/breath sounds equal/clear to auscultation bilaterally

## 2020-02-24 NOTE — H&P PST ADULT - HISTORY OF PRESENT ILLNESS
48 y/o female with h/o Asthma  controlled with meds , obesity s/p gastric bypass in 2017( lost 80 lbs),  lipoma upper back and s/p excisions. Pt has been c/o pain to upper back old scar area followed by Dr Hobson planned for Scar Revision Upper back on 2/27/2020.

## 2020-07-30 PROBLEM — Z86.018 PERSONAL HISTORY OF OTHER BENIGN NEOPLASM: Chronic | Status: ACTIVE | Noted: 2020-02-24

## 2020-07-30 PROBLEM — J45.909 UNSPECIFIED ASTHMA, UNCOMPLICATED: Chronic | Status: ACTIVE | Noted: 2018-05-25

## 2020-08-06 ENCOUNTER — OUTPATIENT (OUTPATIENT)
Dept: OUTPATIENT SERVICES | Facility: HOSPITAL | Age: 50
LOS: 1 days | End: 2020-08-06
Payer: MEDICAID

## 2020-08-06 VITALS
SYSTOLIC BLOOD PRESSURE: 123 MMHG | TEMPERATURE: 98 F | WEIGHT: 158.07 LBS | HEIGHT: 66 IN | HEART RATE: 60 BPM | OXYGEN SATURATION: 98 % | RESPIRATION RATE: 16 BRPM | DIASTOLIC BLOOD PRESSURE: 84 MMHG

## 2020-08-06 DIAGNOSIS — Z98.890 OTHER SPECIFIED POSTPROCEDURAL STATES: Chronic | ICD-10-CM

## 2020-08-06 DIAGNOSIS — Z98.84 BARIATRIC SURGERY STATUS: Chronic | ICD-10-CM

## 2020-08-06 DIAGNOSIS — L90.5 SCAR CONDITIONS AND FIBROSIS OF SKIN: ICD-10-CM

## 2020-08-06 LAB
ANION GAP SERPL CALC-SCNC: 15 MMOL/L — SIGNIFICANT CHANGE UP (ref 5–17)
BUN SERPL-MCNC: 10 MG/DL — SIGNIFICANT CHANGE UP (ref 7–23)
CALCIUM SERPL-MCNC: 9.2 MG/DL — SIGNIFICANT CHANGE UP (ref 8.4–10.5)
CHLORIDE SERPL-SCNC: 101 MMOL/L — SIGNIFICANT CHANGE UP (ref 96–108)
CO2 SERPL-SCNC: 21 MMOL/L — LOW (ref 22–31)
CREAT SERPL-MCNC: 0.75 MG/DL — SIGNIFICANT CHANGE UP (ref 0.5–1.3)
GLUCOSE SERPL-MCNC: 109 MG/DL — HIGH (ref 70–99)
HCT VFR BLD CALC: 40.6 % — SIGNIFICANT CHANGE UP (ref 34.5–45)
HCV AB S/CO SERPL IA: 0.06 S/CO — SIGNIFICANT CHANGE UP (ref 0–0.99)
HCV AB SERPL-IMP: SIGNIFICANT CHANGE UP
HGB BLD-MCNC: 13.3 G/DL — SIGNIFICANT CHANGE UP (ref 11.5–15.5)
HIV 1+2 AB+HIV1 P24 AG SERPL QL IA: SIGNIFICANT CHANGE UP
MCHC RBC-ENTMCNC: 29 PG — SIGNIFICANT CHANGE UP (ref 27–34)
MCHC RBC-ENTMCNC: 32.8 GM/DL — SIGNIFICANT CHANGE UP (ref 32–36)
MCV RBC AUTO: 88.6 FL — SIGNIFICANT CHANGE UP (ref 80–100)
NRBC # BLD: 0 /100 WBCS — SIGNIFICANT CHANGE UP (ref 0–0)
PLATELET # BLD AUTO: 298 K/UL — SIGNIFICANT CHANGE UP (ref 150–400)
POTASSIUM SERPL-MCNC: 3.9 MMOL/L — SIGNIFICANT CHANGE UP (ref 3.5–5.3)
POTASSIUM SERPL-SCNC: 3.9 MMOL/L — SIGNIFICANT CHANGE UP (ref 3.5–5.3)
RBC # BLD: 4.58 M/UL — SIGNIFICANT CHANGE UP (ref 3.8–5.2)
RBC # FLD: 13.1 % — SIGNIFICANT CHANGE UP (ref 10.3–14.5)
SODIUM SERPL-SCNC: 137 MMOL/L — SIGNIFICANT CHANGE UP (ref 135–145)
WBC # BLD: 4.44 K/UL — SIGNIFICANT CHANGE UP (ref 3.8–10.5)
WBC # FLD AUTO: 4.44 K/UL — SIGNIFICANT CHANGE UP (ref 3.8–10.5)

## 2020-08-06 PROCEDURE — 87389 HIV-1 AG W/HIV-1&-2 AB AG IA: CPT

## 2020-08-06 PROCEDURE — G0463: CPT

## 2020-08-06 PROCEDURE — 80048 BASIC METABOLIC PNL TOTAL CA: CPT

## 2020-08-06 PROCEDURE — 85027 COMPLETE CBC AUTOMATED: CPT

## 2020-08-06 PROCEDURE — 86803 HEPATITIS C AB TEST: CPT

## 2020-08-06 NOTE — H&P PST ADULT - NEGATIVE RESPIRATORY AND THORAX SYMPTOMS
no wheezing/no pleuritic chest pain/no hemoptysis/no cough/no dyspnea/states recent asthma episode, on medrol dose pack and prn inhalers

## 2020-08-06 NOTE — H&P PST ADULT - RS GEN PE MLT RESP DETAILS PC
normal/breath sounds equal/no chest wall tenderness/clear to auscultation bilaterally/no subcutaneous emphysema/no rhonchi/no wheezes/respirations non-labored/airway patent/good air movement/no intercostal retractions

## 2020-08-06 NOTE — H&P PST ADULT - HISTORY OF PRESENT ILLNESS
50 y/o female with h/o Asthma  controlled with meds , obesity s/p gastric bypass in 2017( lost 80 lbs),  lipoma upper back and s/p excisions. Pt has been c/o pain to upper back old scar area followed by Dr Hobson planned for Scar Revision Upper back on 8/20/20. Denies any palpitations, SOB, N/V, fever or chills.   ***COVID swab scheduled for 8/17/20*** .

## 2020-08-06 NOTE — H&P PST ADULT - NSICDXPROBLEM_GEN_ALL_CORE_FT
PROBLEM DIAGNOSES  Problem: Scar condition and fibrosis of skin  Assessment and Plan: Scar Revision Upper back on 8/20/20  Medical Eval; Pending (8/7/20)  Pre-op education provided - all questions answered. Pt verbalized understanding

## 2020-08-06 NOTE — H&P PST ADULT - GASTROINTESTINAL DETAILS
no distention/no organomegaly/nontender/soft/no guarding/no rebound tenderness/no bruit/no rigidity/no masses palpable/bowel sounds normal

## 2020-08-16 DIAGNOSIS — Z01.818 ENCOUNTER FOR OTHER PREPROCEDURAL EXAMINATION: ICD-10-CM

## 2020-08-17 ENCOUNTER — APPOINTMENT (OUTPATIENT)
Dept: DISASTER EMERGENCY | Facility: CLINIC | Age: 50
End: 2020-08-17

## 2020-08-19 ENCOUNTER — TRANSCRIPTION ENCOUNTER (OUTPATIENT)
Age: 50
End: 2020-08-19

## 2020-08-19 RX ORDER — SODIUM CHLORIDE 9 MG/ML
1000 INJECTION, SOLUTION INTRAVENOUS
Refills: 0 | Status: DISCONTINUED | OUTPATIENT
Start: 2020-08-20 | End: 2020-09-04

## 2020-08-20 ENCOUNTER — OUTPATIENT (OUTPATIENT)
Dept: OUTPATIENT SERVICES | Facility: HOSPITAL | Age: 50
LOS: 1 days | End: 2020-08-20
Payer: MEDICAID

## 2020-08-20 ENCOUNTER — RESULT REVIEW (OUTPATIENT)
Age: 50
End: 2020-08-20

## 2020-08-20 VITALS
DIASTOLIC BLOOD PRESSURE: 72 MMHG | HEIGHT: 66 IN | RESPIRATION RATE: 16 BRPM | HEART RATE: 67 BPM | OXYGEN SATURATION: 100 % | WEIGHT: 158.07 LBS | SYSTOLIC BLOOD PRESSURE: 116 MMHG | TEMPERATURE: 97 F

## 2020-08-20 VITALS
OXYGEN SATURATION: 100 % | RESPIRATION RATE: 15 BRPM | DIASTOLIC BLOOD PRESSURE: 70 MMHG | SYSTOLIC BLOOD PRESSURE: 121 MMHG | HEART RATE: 78 BPM

## 2020-08-20 DIAGNOSIS — Z98.890 OTHER SPECIFIED POSTPROCEDURAL STATES: Chronic | ICD-10-CM

## 2020-08-20 DIAGNOSIS — L90.5 SCAR CONDITIONS AND FIBROSIS OF SKIN: ICD-10-CM

## 2020-08-20 DIAGNOSIS — Z98.84 BARIATRIC SURGERY STATUS: Chronic | ICD-10-CM

## 2020-08-20 PROCEDURE — 11406 EXC TR-EXT B9+MARG >4.0 CM: CPT

## 2020-08-20 PROCEDURE — 13102 CMPLX RPR TRUNK ADDL 5CM/<: CPT

## 2020-08-20 PROCEDURE — 13101 CMPLX RPR TRUNK 2.6-7.5 CM: CPT

## 2020-08-20 PROCEDURE — 88305 TISSUE EXAM BY PATHOLOGIST: CPT

## 2020-08-20 RX ORDER — ALBUTEROL 90 UG/1
2 AEROSOL, METERED ORAL
Qty: 0 | Refills: 0 | DISCHARGE

## 2020-08-20 RX ORDER — LORATADINE 10 MG/1
1 TABLET ORAL
Qty: 0 | Refills: 0 | DISCHARGE

## 2020-08-20 NOTE — BRIEF OPERATIVE NOTE - NSICDXBRIEFPROCEDURE_GEN_ALL_CORE_FT
PROCEDURES:  Excision of benign skin lesion (excluding skin tags) of back, over 4.0cm 20-Aug-2020 09:42:12  Darshana Stein  Intermediate repair of wound of back, 10.1cm to 12.5cm 20-Aug-2020 09:41:25  Darshana Stein

## 2020-08-20 NOTE — ASU DISCHARGE PLAN (ADULT/PEDIATRIC) - ASU DC SPECIAL INSTRUCTIONSFT
WOUND CARE: You have a transparent/purple liquid dressing (called Dermabond Prineo) over your surgical incisions, covered with an Aquacel and clear Tegaderm overlay. Please do not remove any of the dressing at home. Dr. Hobson will remove them at your follow-up appointment in the office.      BATHING: Please do not submerge wound underwater. You may shower and/or sponge bathe.    ACTIVITY: No heavy lifting anything more than 10-15lbs or straining. Otherwise, you may return to your usual level of physical activity. If you are taking narcotic pain medication (such as Percocet), do NOT drive a car, operate machinery or make important decisions.      NOTIFY YOUR SURGEON IF: You have any bleeding that does not stop, any pus draining from your wound, any fever (over 100.4 F) or chills, persistent nausea/vomiting with inability to tolerate food or liquids, persistent diarrhea, or if your pain is not controlled on your discharge pain medications.    FOLLOW-UP:  1. Please call to make a follow-up appointment with Dr. Hobson in his office within one week of discharge   2. Please follow up with your primary care physician in one week regarding your hospitalization.

## 2020-08-20 NOTE — ASU PATIENT PROFILE, ADULT - PSH
H/O gastric bypass  2017 sleeve  History of facial surgery  Bilateral cosmetic upper blepharoplasty, liposuction of the anterior lower abdomen for fat harvesting for facial fat grafting into the mid and lower face. 6/2019  S/P excision of lipoma  2014

## 2020-08-20 NOTE — BRIEF OPERATIVE NOTE - NSICDXBRIEFPREOP_GEN_ALL_CORE_FT
PRE-OP DIAGNOSIS:  Scar of back 20-Aug-2020 09:36:54  Darshana Stein  Scar condition and fibrosis of skin 20-Aug-2020 09:36:18  Darshana Stein

## 2020-08-20 NOTE — ASU PATIENT PROFILE, ADULT - TEACHING/LEARNING RELIGIOUS CONSIDERATIONS
patient brought in by EMS from [facility] for hyperglycemia. on arrival, patient's . no complaints - denies chest pain, shortness of breath, headache, dizziness, nausea, vomiting or pain. no acute distress noted on arrival. as per papers from [facility], patient is on Januvia and Metformin. EMS states patient stopped insulin about a month ago.
none

## 2020-08-20 NOTE — ASU DISCHARGE PLAN (ADULT/PEDIATRIC) - CARE PROVIDER_API CALL
Cheng Leung  SURGERY  [address] [place] [address]  Phone: [phone]  Fax: [phone]  Follow Up Time: 1 week

## 2020-08-20 NOTE — ASU DISCHARGE PLAN (ADULT/PEDIATRIC) - CALL YOUR DOCTOR IF YOU HAVE ANY OF THE FOLLOWING:
Bleeding that does not stop/Swelling that gets worse/Wound/Surgical Site with redness, or foul smelling discharge or pus/Nausea and vomiting that does not stop/Pain not relieved by Medications

## 2020-08-20 NOTE — BRIEF OPERATIVE NOTE - NSICDXBRIEFPOSTOP_GEN_ALL_CORE_FT
POST-OP DIAGNOSIS:  Scar of back 20-Aug-2020 09:45:54  Darshana Stein  Scar conditions/skin fibrosis 20-Aug-2020 09:45:21  Darshana Stein

## 2020-08-20 NOTE — PRE-ANESTHESIA EVALUATION ADULT - NSPROPOSEDPROCEDFT_GEN_ALL_CORE
Medical Necessity Information: It is in your best interest to select a reason for this procedure from the list below. All of these items fulfill various CMS LCD requirements except lesion extends to a margin. Scar revision of upper back

## 2020-08-20 NOTE — ASU PREOP CHECKLIST - CHLOROHEXIDINE WASH IN ASU
[facility]  used for Yoruba language ID# [id number]. Patient notified and verbalized understanding of providers response. 3-way repeat back was completed on the information provided by the provider for verification and accuracy. Patient was in agreement and denied further questions or concerns. Call connected to PSR line to make appointment.
Below refill request requires your intervention because: -doxazosin    Â· No diagnosis of benign prostatic hyperplasia on problem list             Prescription has been electronically faxed to your pharmacy. Filled per medication protocol.
Fwd to WAYNE BACA for scheduling
Needs follow-up in office as he has not been seen since 4/2019. Please schedule.
Sending trying to reach you letter
20-Aug-2020 07:24

## 2020-08-20 NOTE — PRE-ANESTHESIA EVALUATION ADULT - NSANTHOSAYNRD_GEN_A_CORE
No. ALBERTO screening performed.  STOP BANG Legend: 0-2 = LOW Risk; 3-4 = INTERMEDIATE Risk; 5-8 = HIGH Risk

## 2020-12-11 ENCOUNTER — TRANSCRIPTION ENCOUNTER (OUTPATIENT)
Age: 50
End: 2020-12-11

## 2021-08-03 NOTE — H&P PST ADULT - NEGATIVE RESPIRATORY AND THORAX SYMPTOMS
.  VITAL SIGNS:  T(C): 36.7 (08-03-21 @ 07:48), Max: 36.7 (08-03-21 @ 07:11)  T(F): 98 (08-03-21 @ 07:48), Max: 98 (08-03-21 @ 07:11)  HR: 66 (08-03-21 @ 07:48) (66 - 66)  BP: 139/64 (08-03-21 @ 07:48) (129/79 - 139/64)  BP(mean): --  RR: 16 (08-03-21 @ 07:48) (16 - 18)  SpO2: 100% (08-03-21 @ 07:48) (99% - 100%)  Wt(kg): --    PHYSICAL EXAM:    Constitutional: Elderly female, WDWN resting comfortably in bed; NAD  Head: NC/AT  Eyes: EOMI, anicteric sclera  ENT: no nasal discharge; uvula midline, no oropharyngeal erythema or exudates; MMM  Respiratory: CTA B/L; no W/R/R, no retractions  Cardiac: +S1/S2; RRR; no M/R/G; PMI non-displaced  Gastrointestinal: soft, NT/ND; no rebound or guarding; +BSx4  Extremities: WWP, no clubbing or cyanosis; no peripheral edema  Musculoskeletal: NROM x4; no joint swelling, tenderness or erythema  Dermatologic: skin warm, dry and intact; no rashes, wounds, or scars  Neurologic: AAOx2 to self and place but not year; forgetful during interview, cannot remember the events of her fall, her daughter's phone number, what medical conditions she has, CNII-XII grossly intact; no focal deficits  Psychiatric: affect and characteristics of appearance, verbalizations, behaviors are appropriate
no wheezing/states recent asthma episode, on medrol dose pack and prn inhalers/no hemoptysis/no pleuritic chest pain/no cough/no dyspnea

## 2022-06-16 NOTE — H&P PST ADULT - NSALCOHOLAMT_GEN_A_CORE_SD
"Subjective:   Patient ID:  Richy Douglas is a 82 y.o. male who presents for cardiac consult of No chief complaint on file.    Referring Physician:    Vivian Ch MD [id number]] [id number] [place] [address]      Reason for consult: CHF, AI    HPI  The patient came in today for cardiac consult of No chief complaint on file.      Richy Douglas is a 82 y.o. male pt with BI V failure EF 30%, moderate AI, GERD, HTN, pulm HTN, PVD, emphysema, aortic aneurysm, FE def anemia presents for follow up CV eval.     4/27/21  He had ER eval last month - Emergency Department for evaluation of SOB which onset gradually x 1 week ago. Pt reports sustaining a rib fracture 1 week ago and states his "breathing got bad". Pt reports smoking "only when football games are on". Symptoms are constant and moderate in severity. No mitigating or exacerbating factors reported. Associated sxs include cough. Pt also notes L foot swelling and L knee swelling that has been intermittent for a few months.     BNP was 1258.     He had CT chest -    Moderate to severe vascular calcification seen involving the aorta.  Coronary artery calcifications are also noted.  There is no pericardial effusion. No enlarged mediastinal, hilar or axillary lymph nodes are identified.    He feels more dyspnea but also CP from rib fracture.     8/3/21  Nuclear stress neg, EF 45%. BNP 1074- Told pt BNP is significantly elevated due to extra fluid, if feeling more shortness of breath or edema can double up fluid pills for 2-3 days and monitor pressures,     Ref Range & Units 2 mo ago 5 mo ago 8 mo ago    BNP 0 - 99 pg/mL 1,047 High   1,258 High  CM  202 High      He has been having some tingling/numbess in L arm.      Hosp stay Sept 2021    ED today with a chief complaint of worsening SOB that started the day prior. Associated symptoms included malaise, productive cough, fever, and pleuritic chest discomfort. Patient denied any associated kevin chest pain, " nausea, vomiting, diaphoresis, palpitations, near syncope, or syncope. Initial workup in ED revealed leukocytosis (WBC 18,000), anemia (H/H 8.8/28.1), and BNP of 929. CXR showed findings consistent with PNA. EKG showed new onset afib with RVR and patient was subsequently admitted for further evaluation and treatment. Cardiology consulted to assist with management. Patient seen and examined today in ED. Still endorses SOB along with pleuritic chest discomfort. No other CV complaints. No prior history of afib. He reports compliance with his medications. Followed on OP basis by Dr. Garces. Chart reviewed. Troponin x 1 negative. Echo 4/21 showed normal EF. MPI stress test 5/21 negative for ischemia.      Hospital Course:   9/25/21-Patient seen and examined today, resting in bed. Feeling much better, SOB improved. Still has some hoarseness. No chest pain. Remains in afib, but HR controlled. Cardizem switched to po and Eliquis initiated. Labs stable. Will need to monitor H/H on AC.  9/26/21-Patient seen and examined today, lying in bed. SOB continues to improve. Still has occasional cough. No chest pain. HR controlled. BC + for gram negative rods. H/H stable.  9/27/21- patient in bed resting. Has no complaints this morning. NSR 68 bpm.. BC +gram negative rods. Labs stable. No abnormal bleeding on Eliquis. cardizem gtt switched to PO  180mg daily   9/28/2021--Patient seen and examined in room, sitting on side of bed. Feels today. Remains in SR. Ok to discharge home from cards standpoint. Will need OP Cards follow up with Dr Garces next week.     12/9/21  Pt had hosp stay in Sept had new onset AFib. He converted to NSR with cardizem drip and treatment of sepsis PNA. BP and HR well controlled today.     Jan 2022 Hospital Course:   1/10/22- pt comfortable on 2L via NC current smoker physical exam consistent with COPD  1/11/22- CT scan of the chest reveals severe emphysematous lungs with bully scattered throughout all lung  fields bilaterally.  Consolidation left upper and middle jose lobes consistent with pneumonia.  No masses. Echo reveals new decline in EF to 25% pt reported chest pain on admission  1/12/22- pt in Afib coronary angiogram postponed. Rate controlled on metoprolol. Daily drinker started on librium  1/13/22- pt underwent LHC today with Dr. Tovar result was clean coronaries. EF documented at 50% although transthoracic echo EF was 25%  1/14 patient reports improvement in sx. Tolerated lhc procedure well. After discussing with cards, ok to d/c. Patient will need to discuss initiation of entresto on o/p basis. Continue losartan, bb, eliquis and lasix. New rxs delivered to bedside.    1/18/22  He presents post hosp stay, neg cath. ECHO with dec EF 25%,EF low normal on LHC.  BP is low normal, pulse 40s. He has been drinking daily for a while but has quit now.     3/17/22  He had recent pulm eval last month and then heme/onc eval, he will need EGD/Csope to rule out GIB. BP and HR stable today. He remains busy with recycling.     5/4/22  Repeat ECHO pending/not completed yet. BP is low normal. HR 80s. He had more swelling L leg/knee.     6/16/22  ECHO in May 2022 with EF 30%, mod RV function decrease, grade 2 DD, mild to mod TR, pulm HTN PASP 93 mmHg, mod AI. LE arterial u/s with distal occlusions/monophasic waveforms, normal BLE MARIBEL. LE venous u/s with b/l baker's cysts and chronic right small saphenous vein thrombus.     PT went to ER after last visit for Hgb 6, was transfused and had f/u with hemeonc and GI. Has upcoming EGD, told elevated CV risk but can proceed and can hold Eliquis 2 days prior.   BP today 140s/70s. HR 80s. 165 lbs.      He fell June 1st, has a large expending hematoma with more pain.     Patient feels no PND, no palpitation, no dizziness, no syncope, no CNS symptoms.    Patient has fairly good exercise tolerance. He does recycling, separation.     Patient is compliant with medications.    FH - mother -  DM, PVD, tobacco    Results for orders placed during the hospital encounter of 05/20/22    Echo    Interpretation Summary  · The left ventricle is moderately enlarged with eccentric hypertrophy and moderately decreased systolic function.  · Severe left atrial enlargement.  · The estimated ejection fraction is 30%.  · Grade II left ventricular diastolic dysfunction.  · Moderate right ventricular enlargement with moderately reduced right ventricular systolic function.  · Mild right atrial enlargement.  · Moderate aortic regurgitation.  · Mild mitral regurgitation.  · Mild to moderate tricuspid regurgitation.  · Mild pulmonic regurgitation.  · Intermediate central venous pressure (8 mmHg).  · The estimated PA systolic pressure is 93 mmHg.  · There is pulmonary hypertension.      LE arterial u/s 5/2022  Conclusion    · Normal BLE MARIBEL with distal occlusions noted with moderate to severe plaque/blockage  · Right distal PT/PER arteries are occluded  · Left distal PT with monophasic waveforms, L GUILLE occluded      Conclusion 5/2022    · A Baker's cyst measuring 1.67 cm x 3.77 cm was seen in the right extremity.  · A Baker's cyst measuring 1.53 cm x 2.78 cm was seen in the left extremity.  · There is no evidence of a left lower extremity DVT.  · The right smaller saphenous vein is abnormal. A chronic thrombus is present.          Results for orders placed during the hospital encounter of 01/09/22    Cardiac catheterization    Conclusion  · The pre-procedure left ventricular end diastolic pressure was 13.  · The estimated blood loss was none.  · The coronary arteries were normal..  · The filling pressures on the right and left were normal.    The procedure log was documented by Documenter: Jenna Cantu, RN and verified by Janneth Tovar MD.    Date: 1/13/2022  Time: 5:22 PM          Past Medical History:   Diagnosis Date    Anemia of chronic disease     Aortic aneurysm     Atrial fibrillation with RVR 9/24/2021     Chronic systolic congestive heart failure 8/31/2017    Emphysema of lung 8/31/2017    GERD (gastroesophageal reflux disease)     Hypertension     Microcytic anemia 11/24/2020    Other hyperlipidemia 4/27/2021    Prostate cancer        Past Surgical History:   Procedure Laterality Date    CATHETERIZATION OF BOTH LEFT AND RIGHT HEART N/A 1/13/2022    Procedure: CATHETERIZATION, HEART, BOTH LEFT AND RIGHT;  Surgeon: Janneth Tovar MD;  Location: [facility] CATH LAB;  Service: Cardiology;  Laterality: N/A;  poss cx    COLONOSCOPY      CORONARY ANGIOGRAPHY N/A 1/13/2022    Procedure: ANGIOGRAM, CORONARY ARTERY;  Surgeon: Janneth Tovar MD;  Location: [facility] CATH LAB;  Service: Cardiology;  Laterality: N/A;    PROSTATE SURGERY      SPINE SURGERY         Social History     Tobacco Use    Smoking status: Current Every Day Smoker     Packs/day: 1.00     Years: 68.00     Pack years: 68.00     Types: Cigarettes     Start date: 1/1/1954    Smokeless tobacco: Never Used   Substance Use Topics    Alcohol use: Yes     Comment: reports only drinks red wine when games are on    Drug use: Never       Family History   Problem Relation Age of Onset    Diabetes Mother     Peripheral vascular disease Mother        Patient's Medications   New Prescriptions    No medications on file   Previous Medications    ALBUTEROL (PROVENTIL) 2.5 MG /3 ML (0.083 %) NEBULIZER SOLUTION    Take 3 mLs (2.5 mg total) by nebulization every 4 to 6 hours as needed for Wheezing or Shortness of Breath.    ALBUTEROL (PROVENTIL/VENTOLIN HFA) 90 MCG/ACTUATION INHALER    Inhale 1-2 puffs into the lungs every 6 (six) hours as needed for Wheezing or Shortness of Breath. Rescue    APIXABAN (ELIQUIS) 5 MG TAB    Take 1 tablet (5 mg total) by mouth 2 (two) times daily.    ATORVASTATIN (LIPITOR) 10 MG TABLET    Take 1 tablet (10 mg total) by mouth once daily.    FUROSEMIDE (LASIX) 20 MG TABLET    Take 1 tablet (20 mg total) by mouth once daily. Do not take  if blood pressure is low, feeling dry/dizzy/lightheaded.    METOPROLOL SUCCINATE (TOPROL-XL) 50 MG 24 HR TABLET    Take 1 tablet (50 mg total) by mouth 2 (two) times daily.    PANTOPRAZOLE (PROTONIX) 40 MG TABLET    Take 1 tablet (40 mg total) by mouth once daily.    SACUBITRIL-VALSARTAN (ENTRESTO) 49-51 MG PER TABLET    Take 1 tablet by mouth 2 (two) times daily.    SOD PICOSULF-MAG OX-CITRIC AC (CLENPIQ) 10 MG-3.5 GRAM -12 GRAM/160 ML SOLN    Take as directed    TIOTROPIUM (SPIRIVA) 18 MCG INHALATION CAPSULE    Inhale 1 capsule (18 mcg total) into the lungs once daily. Controller   Modified Medications    No medications on file   Discontinued Medications    No medications on file       Review of Systems   Constitutional: Positive for malaise/fatigue.   HENT: Negative.    Eyes: Negative.    Respiratory: Positive for shortness of breath.    Cardiovascular: Positive for chest pain.   Gastrointestinal: Negative.    Genitourinary: Negative.    Musculoskeletal: Negative.    Skin: Negative.    Neurological: Negative.    Endo/Heme/Allergies: Negative.    Psychiatric/Behavioral: Negative.    All 12 systems otherwise negative.      Wt Readings from Last 3 Encounters:   06/16/22 74.9 kg (165 lb 2 oz)   05/23/22 72.9 kg (160 lb 11.5 oz)   05/20/22 73.5 kg (162 lb)     Temp Readings from Last 3 Encounters:   05/12/22 98 °F (36.7 °C) (Temporal)   05/05/22 98 °F (36.7 °C) (Oral)   02/21/22 97.5 °F (36.4 °C) (Temporal)     BP Readings from Last 3 Encounters:   06/16/22 (!) 140/72   05/23/22 116/60   05/20/22 (!) 148/70     Pulse Readings from Last 3 Encounters:   06/16/22 80   05/23/22 86   05/12/22 73       BP (!) 140/72 (BP Location: Left arm, Patient Position: Sitting, BP Method: Medium (Manual))   Pulse 80   Wt 74.9 kg (165 lb 2 oz)   SpO2 95%   BMI 24.38 kg/m²     Objective:   Physical Exam  Vitals and nursing note reviewed.   Constitutional:       General: He is not in acute distress.     Appearance: He is  well-developed. He is not diaphoretic.   HENT:      Head: Normocephalic and atraumatic.      Nose: Nose normal.   Eyes:      General: No scleral icterus.     Conjunctiva/sclera: Conjunctivae normal.   Neck:      Thyroid: No thyromegaly.      Vascular: No JVD.   Cardiovascular:      Rate and Rhythm: Normal rate and regular rhythm.      Heart sounds: S1 normal and S2 normal. No murmur heard.    No friction rub. No gallop. No S3 or S4 sounds.   Pulmonary:      Effort: Pulmonary effort is normal. No respiratory distress.      Breath sounds: Normal breath sounds. No stridor. No wheezing or rales.   Chest:      Chest wall: No tenderness.   Abdominal:      General: Bowel sounds are normal. There is no distension.      Palpations: Abdomen is soft. There is no mass.      Tenderness: There is no abdominal tenderness. There is no rebound.   Genitourinary:     Comments: Deferred  Musculoskeletal:         General: No tenderness or deformity. Normal range of motion.      Cervical back: Normal range of motion and neck supple.   Lymphadenopathy:      Cervical: No cervical adenopathy.   Skin:     General: Skin is warm and dry.      Coloration: Skin is not pale.      Findings: No erythema or rash.   Neurological:      Mental Status: He is alert and oriented to person, place, and time.      Motor: No abnormal muscle tone.      Coordination: Coordination normal.   Psychiatric:         Behavior: Behavior normal.         Thought Content: Thought content normal.         Judgment: Judgment normal.         Lab Results   Component Value Date     05/12/2022    K 4.2 05/12/2022     05/12/2022    CO2 24 05/12/2022    BUN 21 05/12/2022    CREATININE 0.8 05/12/2022    GLU 91 05/12/2022    MG 2.1 05/04/2022    AST 14 05/12/2022    ALT 10 05/12/2022    ALBUMIN 3.8 05/12/2022    PROT 6.9 05/12/2022    BILITOT 0.6 05/12/2022    WBC 6.78 05/12/2022    HGB 7.6 (L) 05/12/2022    HCT 26.7 (L) 05/12/2022    MCV 68 (L) 05/12/2022      05/12/2022    INR 1.4 (H) 09/24/2021    TSH 0.870 11/24/2020    CHOL 144 09/17/2020    HDL 61 09/17/2020    LDLCALC 74.0 09/17/2020    TRIG 45 09/17/2020     (H) 05/04/2022     Assessment:      1. Chronic combined systolic and diastolic congestive heart failure, NYHA class 3    2. Claudication    3. Localized edema    4. Pulmonary emphysema, unspecified emphysema type    5. Atherosclerosis of abdominal aorta    6. PAF (paroxysmal atrial fibrillation)    7. Nonrheumatic aortic valve insufficiency    8. SANCHEZ (dyspnea on exertion)    9. Iron deficiency anemia due to chronic blood loss    10. History of lower gastrointestinal bleeding    11. PVD (peripheral vascular disease)    12. Pulmonary hypertension    13. Dilated aortic root        Plan:   1. BIV failure EF 30%, AI with aortic atherosc;  BNP 1047 -- 969; BNP 1844 --> 615  - ECHO in May 2022 with EF 30%, mod RV function decrease, grade 2 DD, mild to mod TR, pulm HTN PASP 93 mmHg, mod AI.   - R/LHC normal coronaries 1/2022  - cont lasix daily and extra PRN  - changed Losartan to Entresto - class 3 symptoms - increase Entresto to masx dose   - refer to EP discuss ICD/CRT    2. Emphysema with dyspnea, pulm HTN  - cont tx PCP/Pulm    3. HTN with aortic root moderately dilated with AI - Aortic root 4.6-4.9 cm --> 4.55  - titrate meds  - cont to monitor     4. HLD with aortic athero and Coronary artery calcifications  - cont statin     5. PAF, diagnosed in setting of sepsis/PNA, had SVT in hosp - sec to alc withdrawal  - Cont Eliquis 5 BID and BB    6. Symptomatic anemia s/p PRBC  - needs EGD/Cscope - Elevated CV risk - proceed as medically/urgently needed - ok to hold Eliquis 2 days prior   - proceed for scope - pt is well compensated   - f/u heme/onc    7. PVD, Leg Edema, pain with claudication   - LE arterial u/s with distal occlusions/monophasic waveforms, normal BLE MARIBEL.   - LE venous u/s with b/l baker's cysts and chronic right small saphenous vein thrombus -  cont Eliquis    8. Fall with hematoma in right hip  - send to ER for I/D and monitor r/o infection  - refer to pain management for knee pain/baker's cysts    Thank you for allowing me to participate in this patient's care. Please do not hesitate to contact me with any questions or concerns. Consult note has been forwarded to the referral physician.                  1-2 drinks

## 2023-03-01 ENCOUNTER — EMERGENCY (EMERGENCY)
Facility: HOSPITAL | Age: 53
LOS: 1 days | Discharge: HOME | End: 2023-03-01
Payer: MEDICAID

## 2023-03-01 VITALS
HEIGHT: 66 IN | SYSTOLIC BLOOD PRESSURE: 130 MMHG | HEART RATE: 111 BPM | DIASTOLIC BLOOD PRESSURE: 88 MMHG | RESPIRATION RATE: 20 BRPM | TEMPERATURE: 99 F | OXYGEN SATURATION: 98 % | WEIGHT: 149.91 LBS

## 2023-03-01 VITALS
SYSTOLIC BLOOD PRESSURE: 128 MMHG | OXYGEN SATURATION: 100 % | DIASTOLIC BLOOD PRESSURE: 83 MMHG | RESPIRATION RATE: 18 BRPM | HEART RATE: 68 BPM | TEMPERATURE: 98 F

## 2023-03-01 DIAGNOSIS — Z98.890 OTHER SPECIFIED POSTPROCEDURAL STATES: Chronic | ICD-10-CM

## 2023-03-01 DIAGNOSIS — Z98.84 BARIATRIC SURGERY STATUS: Chronic | ICD-10-CM

## 2023-03-01 LAB
ALBUMIN SERPL ELPH-MCNC: 4.8 G/DL — SIGNIFICANT CHANGE UP (ref 3.3–5)
ALP SERPL-CCNC: 111 U/L — SIGNIFICANT CHANGE UP (ref 40–120)
ALT FLD-CCNC: 15 U/L — SIGNIFICANT CHANGE UP (ref 10–45)
ANION GAP SERPL CALC-SCNC: 11 MMOL/L — SIGNIFICANT CHANGE UP (ref 5–17)
APPEARANCE UR: CLEAR — SIGNIFICANT CHANGE UP
AST SERPL-CCNC: 17 U/L — SIGNIFICANT CHANGE UP (ref 10–40)
BACTERIA # UR AUTO: ABNORMAL
BASE EXCESS BLDV CALC-SCNC: 3.5 MMOL/L — HIGH (ref -2–3)
BASOPHILS # BLD AUTO: 0.06 K/UL — SIGNIFICANT CHANGE UP (ref 0–0.2)
BASOPHILS NFR BLD AUTO: 1.2 % — SIGNIFICANT CHANGE UP (ref 0–2)
BILIRUB SERPL-MCNC: 0.9 MG/DL — SIGNIFICANT CHANGE UP (ref 0.2–1.2)
BILIRUB UR-MCNC: NEGATIVE — SIGNIFICANT CHANGE UP
BLD GP AB SCN SERPL QL: NEGATIVE — SIGNIFICANT CHANGE UP
BUN SERPL-MCNC: 10 MG/DL — SIGNIFICANT CHANGE UP (ref 7–23)
CA-I SERPL-SCNC: 1.24 MMOL/L — SIGNIFICANT CHANGE UP (ref 1.15–1.33)
CALCIUM SERPL-MCNC: 10 MG/DL — SIGNIFICANT CHANGE UP (ref 8.4–10.5)
CHLORIDE BLDV-SCNC: 100 MMOL/L — SIGNIFICANT CHANGE UP (ref 96–108)
CHLORIDE SERPL-SCNC: 102 MMOL/L — SIGNIFICANT CHANGE UP (ref 96–108)
CO2 BLDV-SCNC: 32 MMOL/L — HIGH (ref 22–26)
CO2 SERPL-SCNC: 25 MMOL/L — SIGNIFICANT CHANGE UP (ref 22–31)
COLOR SPEC: SIGNIFICANT CHANGE UP
CREAT SERPL-MCNC: 1 MG/DL — SIGNIFICANT CHANGE UP (ref 0.5–1.3)
DIFF PNL FLD: NEGATIVE — SIGNIFICANT CHANGE UP
EGFR: 68 ML/MIN/1.73M2 — SIGNIFICANT CHANGE UP
EOSINOPHIL # BLD AUTO: 0.24 K/UL — SIGNIFICANT CHANGE UP (ref 0–0.5)
EOSINOPHIL NFR BLD AUTO: 4.9 % — SIGNIFICANT CHANGE UP (ref 0–6)
EPI CELLS # UR: 1 /HPF — SIGNIFICANT CHANGE UP
FLUAV AG NPH QL: SIGNIFICANT CHANGE UP
FLUBV AG NPH QL: SIGNIFICANT CHANGE UP
GAS PNL BLDV: 134 MMOL/L — LOW (ref 136–145)
GAS PNL BLDV: SIGNIFICANT CHANGE UP
GAS PNL BLDV: SIGNIFICANT CHANGE UP
GLUCOSE BLDV-MCNC: 131 MG/DL — HIGH (ref 70–99)
GLUCOSE SERPL-MCNC: 135 MG/DL — HIGH (ref 70–99)
GLUCOSE UR QL: NEGATIVE — SIGNIFICANT CHANGE UP
HCG SERPL-ACNC: 3.2 MIU/ML — SIGNIFICANT CHANGE UP
HCO3 BLDV-SCNC: 30 MMOL/L — HIGH (ref 22–29)
HCT VFR BLD CALC: 45.7 % — HIGH (ref 34.5–45)
HCT VFR BLDA CALC: 47 % — HIGH (ref 34.5–46.5)
HGB BLD CALC-MCNC: 15.6 G/DL — SIGNIFICANT CHANGE UP (ref 11.7–16.1)
HGB BLD-MCNC: 15.3 G/DL — SIGNIFICANT CHANGE UP (ref 11.5–15.5)
HYALINE CASTS # UR AUTO: 8 /LPF — HIGH (ref 0–2)
KETONES UR-MCNC: NEGATIVE — SIGNIFICANT CHANGE UP
LACTATE BLDV-MCNC: 1.5 MMOL/L — SIGNIFICANT CHANGE UP (ref 0.5–2)
LEUKOCYTE ESTERASE UR-ACNC: NEGATIVE — SIGNIFICANT CHANGE UP
LIDOCAIN IGE QN: 15 U/L — SIGNIFICANT CHANGE UP (ref 7–60)
LYMPHOCYTES # BLD AUTO: 1.53 K/UL — SIGNIFICANT CHANGE UP (ref 1–3.3)
LYMPHOCYTES # BLD AUTO: 31.2 % — SIGNIFICANT CHANGE UP (ref 13–44)
MCHC RBC-ENTMCNC: 29.1 PG — SIGNIFICANT CHANGE UP (ref 27–34)
MCHC RBC-ENTMCNC: 33.5 GM/DL — SIGNIFICANT CHANGE UP (ref 32–36)
MCV RBC AUTO: 86.9 FL — SIGNIFICANT CHANGE UP (ref 80–100)
MONOCYTES # BLD AUTO: 0.49 K/UL — SIGNIFICANT CHANGE UP (ref 0–0.9)
MONOCYTES NFR BLD AUTO: 10 % — SIGNIFICANT CHANGE UP (ref 2–14)
NEUTROPHILS # BLD AUTO: 2.58 K/UL — SIGNIFICANT CHANGE UP (ref 1.8–7.4)
NEUTROPHILS NFR BLD AUTO: 52.7 % — SIGNIFICANT CHANGE UP (ref 43–77)
NITRITE UR-MCNC: NEGATIVE — SIGNIFICANT CHANGE UP
NRBC # BLD: 0 /100 WBCS — SIGNIFICANT CHANGE UP (ref 0–0)
PCO2 BLDV: 54 MMHG — HIGH (ref 39–42)
PH BLDV: 7.36 — SIGNIFICANT CHANGE UP (ref 7.32–7.43)
PH UR: 7 — SIGNIFICANT CHANGE UP (ref 5–8)
PLATELET # BLD AUTO: 345 K/UL — SIGNIFICANT CHANGE UP (ref 150–400)
PO2 BLDV: 21 MMHG — LOW (ref 25–45)
POTASSIUM BLDV-SCNC: 4.2 MMOL/L — SIGNIFICANT CHANGE UP (ref 3.5–5.1)
POTASSIUM SERPL-MCNC: 3.9 MMOL/L — SIGNIFICANT CHANGE UP (ref 3.5–5.3)
POTASSIUM SERPL-SCNC: 3.9 MMOL/L — SIGNIFICANT CHANGE UP (ref 3.5–5.3)
PROT SERPL-MCNC: 7.8 G/DL — SIGNIFICANT CHANGE UP (ref 6–8.3)
PROT UR-MCNC: ABNORMAL
RBC # BLD: 5.26 M/UL — HIGH (ref 3.8–5.2)
RBC # FLD: 13.2 % — SIGNIFICANT CHANGE UP (ref 10.3–14.5)
RBC CASTS # UR COMP ASSIST: 1 /HPF — SIGNIFICANT CHANGE UP (ref 0–4)
RH IG SCN BLD-IMP: POSITIVE — SIGNIFICANT CHANGE UP
RSV RNA NPH QL NAA+NON-PROBE: SIGNIFICANT CHANGE UP
SAO2 % BLDV: 27.8 % — LOW (ref 67–88)
SARS-COV-2 RNA SPEC QL NAA+PROBE: SIGNIFICANT CHANGE UP
SODIUM SERPL-SCNC: 138 MMOL/L — SIGNIFICANT CHANGE UP (ref 135–145)
SP GR SPEC: 1.02 — SIGNIFICANT CHANGE UP (ref 1.01–1.02)
TROPONIN T, HIGH SENSITIVITY RESULT: 7 NG/L — SIGNIFICANT CHANGE UP (ref 0–51)
UROBILINOGEN FLD QL: NEGATIVE — SIGNIFICANT CHANGE UP
WBC # BLD: 4.9 K/UL — SIGNIFICANT CHANGE UP (ref 3.8–10.5)
WBC # FLD AUTO: 4.9 K/UL — SIGNIFICANT CHANGE UP (ref 3.8–10.5)
WBC UR QL: 4 /HPF — SIGNIFICANT CHANGE UP (ref 0–5)

## 2023-03-01 PROCEDURE — 82330 ASSAY OF CALCIUM: CPT

## 2023-03-01 PROCEDURE — 81001 URINALYSIS AUTO W/SCOPE: CPT

## 2023-03-01 PROCEDURE — 82803 BLOOD GASES ANY COMBINATION: CPT

## 2023-03-01 PROCEDURE — 86850 RBC ANTIBODY SCREEN: CPT

## 2023-03-01 PROCEDURE — 83605 ASSAY OF LACTIC ACID: CPT

## 2023-03-01 PROCEDURE — 80053 COMPREHEN METABOLIC PANEL: CPT

## 2023-03-01 PROCEDURE — 85018 HEMOGLOBIN: CPT

## 2023-03-01 PROCEDURE — 71045 X-RAY EXAM CHEST 1 VIEW: CPT | Mod: 26

## 2023-03-01 PROCEDURE — 84484 ASSAY OF TROPONIN QUANT: CPT

## 2023-03-01 PROCEDURE — 96366 THER/PROPH/DIAG IV INF ADDON: CPT

## 2023-03-01 PROCEDURE — 96365 THER/PROPH/DIAG IV INF INIT: CPT

## 2023-03-01 PROCEDURE — 74176 CT ABD & PELVIS W/O CONTRAST: CPT | Mod: MD

## 2023-03-01 PROCEDURE — 74176 CT ABD & PELVIS W/O CONTRAST: CPT | Mod: 26,MD

## 2023-03-01 PROCEDURE — 86900 BLOOD TYPING SEROLOGIC ABO: CPT

## 2023-03-01 PROCEDURE — 85025 COMPLETE CBC W/AUTO DIFF WBC: CPT

## 2023-03-01 PROCEDURE — 84702 CHORIONIC GONADOTROPIN TEST: CPT

## 2023-03-01 PROCEDURE — 71045 X-RAY EXAM CHEST 1 VIEW: CPT

## 2023-03-01 PROCEDURE — 96375 TX/PRO/DX INJ NEW DRUG ADDON: CPT

## 2023-03-01 PROCEDURE — 93005 ELECTROCARDIOGRAM TRACING: CPT

## 2023-03-01 PROCEDURE — 82435 ASSAY OF BLOOD CHLORIDE: CPT

## 2023-03-01 PROCEDURE — 99285 EMERGENCY DEPT VISIT HI MDM: CPT

## 2023-03-01 PROCEDURE — 87637 SARSCOV2&INF A&B&RSV AMP PRB: CPT

## 2023-03-01 PROCEDURE — 85014 HEMATOCRIT: CPT

## 2023-03-01 PROCEDURE — 82947 ASSAY GLUCOSE BLOOD QUANT: CPT

## 2023-03-01 PROCEDURE — 83690 ASSAY OF LIPASE: CPT

## 2023-03-01 PROCEDURE — 84295 ASSAY OF SERUM SODIUM: CPT

## 2023-03-01 PROCEDURE — 86901 BLOOD TYPING SEROLOGIC RH(D): CPT

## 2023-03-01 PROCEDURE — 99285 EMERGENCY DEPT VISIT HI MDM: CPT | Mod: 25

## 2023-03-01 PROCEDURE — 84132 ASSAY OF SERUM POTASSIUM: CPT

## 2023-03-01 RX ORDER — SODIUM CHLORIDE 9 MG/ML
1000 INJECTION INTRAMUSCULAR; INTRAVENOUS; SUBCUTANEOUS ONCE
Refills: 0 | Status: COMPLETED | OUTPATIENT
Start: 2023-03-01 | End: 2023-03-01

## 2023-03-01 RX ORDER — THIAMINE MONONITRATE (VIT B1) 100 MG
100 TABLET ORAL ONCE
Refills: 0 | Status: COMPLETED | OUTPATIENT
Start: 2023-03-01 | End: 2023-03-01

## 2023-03-01 RX ORDER — METOCLOPRAMIDE HCL 10 MG
1 TABLET ORAL
Qty: 30 | Refills: 0
Start: 2023-03-01 | End: 2023-03-10

## 2023-03-01 RX ORDER — ONDANSETRON 8 MG/1
4 TABLET, FILM COATED ORAL ONCE
Refills: 0 | Status: COMPLETED | OUTPATIENT
Start: 2023-03-01 | End: 2023-03-01

## 2023-03-01 RX ORDER — ACETAMINOPHEN 500 MG
1000 TABLET ORAL ONCE
Refills: 0 | Status: COMPLETED | OUTPATIENT
Start: 2023-03-01 | End: 2023-03-01

## 2023-03-01 RX ORDER — METOCLOPRAMIDE HCL 10 MG
10 TABLET ORAL ONCE
Refills: 0 | Status: DISCONTINUED | OUTPATIENT
Start: 2023-03-01 | End: 2023-03-01

## 2023-03-01 RX ORDER — FOLIC ACID 0.8 MG
1 TABLET ORAL ONCE
Refills: 0 | Status: COMPLETED | OUTPATIENT
Start: 2023-03-01 | End: 2023-03-01

## 2023-03-01 RX ORDER — METOCLOPRAMIDE HCL 10 MG
10 TABLET ORAL ONCE
Refills: 0 | Status: COMPLETED | OUTPATIENT
Start: 2023-03-01 | End: 2023-03-01

## 2023-03-01 RX ADMIN — Medication 100 MILLIGRAM(S): at 16:44

## 2023-03-01 RX ADMIN — ONDANSETRON 4 MILLIGRAM(S): 8 TABLET, FILM COATED ORAL at 15:53

## 2023-03-01 RX ADMIN — Medication 1 MILLIGRAM(S): at 17:19

## 2023-03-01 RX ADMIN — Medication 400 MILLIGRAM(S): at 16:46

## 2023-03-01 RX ADMIN — Medication 1000 MILLIGRAM(S): at 18:22

## 2023-03-01 RX ADMIN — SODIUM CHLORIDE 1000 MILLILITER(S): 9 INJECTION INTRAMUSCULAR; INTRAVENOUS; SUBCUTANEOUS at 18:22

## 2023-03-01 RX ADMIN — SODIUM CHLORIDE 1000 MILLILITER(S): 9 INJECTION INTRAMUSCULAR; INTRAVENOUS; SUBCUTANEOUS at 15:53

## 2023-03-01 RX ADMIN — Medication 10 MILLIGRAM(S): at 19:15

## 2023-03-01 NOTE — ED ADULT NURSE NOTE - OBJECTIVE STATEMENT
First encounter with the pt, pt received from triage with complaints of abd pain with n/v for three weeks. Pt is a/ox4 and verbal. Speech is clear and able to speak in full sentences without distress. Airway is patent with no obstruction nor blocking secretions. Breathing is even and unlabored on room air. Pt has strong pulses palpated bilaterally.  Neuro check is wnl. Full rom. Pt denies chest pain, dizziness and sob. No acute distress noted. Pt has call light within the reach of the pt at the bedside. Will continue to monitor the pt.

## 2023-03-01 NOTE — ED PROVIDER NOTE - OBJECTIVE STATEMENT
52-year-old female history of asthma gastric sleeve in 2017 performed at [place] coming into our emergency department with 3 weeks of nausea and vomiting and not tolerating p.o.  Patient reporting ability to tolerate minimal amounts of fluids but if she tries to have any significant bolus she vomits it up.  No blood no bile in vomit does endorse some abdominal pain.  Nothing makes her complaints better or worse

## 2023-03-01 NOTE — ED PROVIDER NOTE - RAPID ASSESSMENT
Private Physician PHYLICIA [place]  52y female pmh Asthma, SP Gastric Sleve 2017 @ [facility]. Pt comes to ed c/o 3w hx of nausea and vomiting not tolerating po. "I keep throwing up everything" Constipation, but passing gas. Pain epigastric., "like a spasm" Pt lost 10b over 3 wk, No fever. Has had similar episodes in past but those resolve in less than one day. This has persisted for 3wk PE Adult female awake alert looking mildly uncomfortable Chest clear anterior & posterior. cv no rubs, gallops or murmurs,   Brendan Powell MD, Facep     PT seen as Triag/Qdoc. Full evaluation to be performed once patient is transferred to main ED.  Brendan Powell MD, Facep Private Physician PHYLICIA [place]  52y female pmh Asthma, SP Gastric Sleve 2017 @ [facility]. Pt comes to ed c/o 3w hx of nausea and vomiting not tolerating po. "I keep throwing up everything" Constipation, but passing gas. Pain epigastric., "like a spasm" Pt lost 10b over 3 wk, No fever. Has had similar episodes in past but those resolve in less than one day. This has persisted for 3wk PE Adult female awake alert looking mildly uncomfortable Chest clear anterior & posterior. cv no rubs, gallops or murmurs,   Brendan Powell MD, Facep     PT seen as Triag/Qdoc. Full evaluation to be performed once patient is transferred to main ED  Brendan Powell MD, Facep     PT seen as Triag/Qdoc. Full evaluation to be performed once patient is transferred to main ED.  Brendan Powell MD, Facep

## 2023-03-01 NOTE — ED PROVIDER NOTE - NS ED ATTENDING STATEMENT MOD
This was a shared visit with the LILIANE. I reviewed and verified the documentation and independently performed the documented:

## 2023-03-01 NOTE — ED PROVIDER NOTE - CLINICAL SUMMARY MEDICAL DECISION MAKING FREE TEXT BOX
Impression and plan: Protracted GI illness with associated tachycardia and p.o. intolerance as well as surgical history of sleeve gastrectomy warrants work-up with labs CT abdomen and pelvis with p.o. contrast; patient is allergic to IV contrast.  IV fluid hydration, antiemetics reassess.  Thiamine and folate

## 2023-03-01 NOTE — ED PROVIDER NOTE - NSFOLLOWUPCLINICS_GEN_ALL_ED_FT
[facility] General Internal Medicine  General Internal Medicine  [address] [place] [address]  Phone: [phone]  Fax:

## 2023-03-01 NOTE — ED PROVIDER NOTE - NSFOLLOWUPINSTRUCTIONS_ED_ALL_ED_FT
1- Rest stay hydrated  2- Reglan 10 mg every 8 hours as needed for nausea  3- [place] diet, advance slowly  4- Follow up with your doctor or our medicine clinic [phone]  5- Return to ER for any new or worsening complaints 1- Rest stay hydrated  2- Reglan 10 mg every 8 hours as needed for nausea  3- [place] diet, advance slowly  4- Follow up with your doctor or our medicine clinic [phone]  5- Return to ER for any new or worsening complaints  6- You have a urinalysis and a urine culture pending, you do not have to wait for these.  You can follow up these results on our Follow My Health jason or you can call our results line tomorrow at 11am to discuss.  [phone]

## 2023-03-01 NOTE — ED PROVIDER NOTE - PROGRESS NOTE DETAILS
Pt with mild improvement with zofran but asking for something more to go home with.  CT AP without acute abnormality and labs all unremarkable.  Will DC home with reglan and follow up with PCP.  Return precautions discussed.  Dania

## 2023-03-01 NOTE — ED PROVIDER NOTE - PATIENT PORTAL LINK FT
You can access the FollowMyHealth Patient Portal offered by [facility] by registering at the following website: [email or web address]. By joining [employer]’s FollowMyHealth portal, you will also be able to view your health information using other applications (apps) compatible with our system.

## 2023-03-01 NOTE — ED PROVIDER NOTE - ATTENDING APP SHARED VISIT CONTRIBUTION OF CARE
MD Gupta:  patient seen and evaluated with the PA.  I was present for key portions of the History and Physical, and I agree with the Impression and Plan.  52-year-old female complaining of 3 weeks of abdominal pain vomiting, generalized PO intolerance  Location of pain epigastrium  Associated symptoms no diarrhea, no fever, no chills no dysuria no flank pain  Context: Sleeve gastrectomy 2017 at [facility]  Modifiers: Any food seems to provoke symptoms  Vital signs: Heart rate 111, otherwise unremarkable  Gen: Well appearing F in NAD.  Head: NC/AT.   PERRL, EOMI.  Neck: trachea midline, supple.  Resp:  No distress, CTA B.  Cardiac RRR, no RMG.    Abdomen:  soft, nondistended, epigastric pain appreciated; no R/G.  Ext: no deformities, no edema.  Neuro:  A&Ox4 appears non focal. Skin:  Warm and dry as visualized, no rash.   Psych:  Normal affect and mood.  Impression and plan: Protracted GI illness with associated tachycardia and p.o. intolerance as well as surgical history of sleeve gastrectomy warrants work-up with labs CT abdomen and pelvis with p.o. contrast; patient is allergic to IV contrast.  IV fluid hydration, antiemetics reassess.  Thiamine and folate

## 2023-06-03 NOTE — H&P PST ADULT - TOBACCO USE
Former smoker
no vomiting/no nausea/no numbness/no change in level of consciousness/no blurred vision/no syncope/no fever/no weakness/no loss of consciousness/no chills
03-Jun-2023 12:20

## 2024-09-27 NOTE — ASU DISCHARGE PLAN (ADULT/PEDIATRIC) - ***IN THE EVENT THAT YOU DEVELOP A COMPLICATION AND YOU ARE UNABLE TO REACH YOUR OWN PHYSICIAN, YOU MAY CONTACT:
Congratulations!! Please review our Pregnancy Essential Guide and [facility] L&D Virtual tour from our networks website.     [facility] Pregnancy Essentials Guide  [facility] ([email or web address])     [facility] ([place]          
Statement Selected

## 2025-03-18 NOTE — H&P PST ADULT - NSICDXPASTMEDICALHX_GEN_ALL_CORE_FT
no edema , regular rate and rhythm
PAST MEDICAL HISTORY:  Asthma denies intubations or hospitalizations    History of lipoma

## 2025-07-18 NOTE — ED PROVIDER NOTE - MEDICAL DECISION MAKING DETAILS
Orthopedic Spine Surgery  History & Physical     H&P reviewed. Spine exam below:       R L    Psoas 5 5   Quad 5 5   Tib ant 5 5   EHL 5 5   GS 5 5     We once again discussed the nature of the surgical procedure, risks, benefits and alternatives have been discussed. Complications were discussed. These include but are not limited to: infection, bleeding, transient or permanent neurologic or vascular injury resulting in pain, paresthesia, numbness, weakness, or gait impairment, continued pain despite surgery, DVT or PE, and the possibility of further surgery in the future.     ---     Leyla Salcedo MD  Orthopedic Spine Surgery     [facility]  [address] [place] [address], [address]   [place] [address]   Phone: [phone]  Fax: [phone]   47 y.o. female hx of gastric sleeve transfer from [facility] for possible acalculous cholecystitis. Comfortable at this time. Sleepy likely 2/2 haldol. Surg consult and recs/plan per them.